# Patient Record
Sex: MALE | Race: WHITE | NOT HISPANIC OR LATINO | Employment: FULL TIME | URBAN - METROPOLITAN AREA
[De-identification: names, ages, dates, MRNs, and addresses within clinical notes are randomized per-mention and may not be internally consistent; named-entity substitution may affect disease eponyms.]

---

## 2020-02-10 ENCOUNTER — CONVERSION ENCOUNTER (OUTPATIENT)
Dept: OTHER | Facility: HOSPITAL | Age: 55
End: 2020-02-10

## 2020-02-10 ENCOUNTER — OFFICE VISIT CONVERTED (OUTPATIENT)
Dept: CARDIOLOGY | Facility: CLINIC | Age: 55
End: 2020-02-10
Attending: SPECIALIST

## 2020-02-20 ENCOUNTER — CONVERSION ENCOUNTER (OUTPATIENT)
Dept: CARDIOLOGY | Facility: CLINIC | Age: 55
End: 2020-02-20
Attending: SPECIALIST

## 2020-02-27 ENCOUNTER — HOSPITAL ENCOUNTER (OUTPATIENT)
Dept: NUCLEAR MEDICINE | Facility: HOSPITAL | Age: 55
Discharge: HOME | End: 2020-02-27
Attending: SPECIALIST

## 2020-03-05 ENCOUNTER — OFFICE VISIT CONVERTED (OUTPATIENT)
Dept: CARDIOLOGY | Facility: CLINIC | Age: 55
End: 2020-03-05
Attending: SPECIALIST

## 2020-11-02 ENCOUNTER — OFFICE VISIT CONVERTED (OUTPATIENT)
Dept: CARDIOLOGY | Facility: CLINIC | Age: 55
End: 2020-11-02
Attending: SPECIALIST

## 2021-05-03 ENCOUNTER — CONVERSION ENCOUNTER (OUTPATIENT)
Dept: OTHER | Facility: HOSPITAL | Age: 56
End: 2021-05-03

## 2021-05-13 NOTE — PROGRESS NOTES
"   Progress Note      Patient Name: Marcell Gunn   Patient ID: [id number]   Sex: Male   YOB: 1965    Primary Care Provider: Annie KEVIN   Referring Provider: Annie KEVIN    Visit Date: November 2, 2020    Provider: Calderon Estes MD   Location: [facility]   Location Address: [address] [place] [address]  [id number]   Location Phone: [phone]          Chief Complaint  · Coronary artery disease   · Hypertension      History Of Present Illness  Marcell Gunn is a 54 year old morbidly obese /White male with history of hypertension; Coronary artery disease. His blood pressure is better controlled now. No shortness of breath.   CURRENT MEDICATIONS: include Amlodipine 5 mg qd; Lisinopril 20 mg bid; Hydrochlorot 12.5 mg qd; Carvedilol 12.5 mg bid;  mg qd. The dosage and frequency of the medications were reviewed with the patient.   PAST MEDICAL HISTORY: Negative for diabetes. Positive for hypertension, Coronary artery disease.   PSYCHOSOCIAL HISTORY: He drinks alcohol moderately and previously quit smoking.       Review of Systems  · Cardiovascular  o Denies  o : chest pain; palpitations (fast, fluttering, or skipping beats), swelling (feet, ankles, hands), shortness of breath while walking or lying flat  · Respiratory  o Denies  o : chronic or frequent cough, asthma or wheezing      Vitals  Date Time BP Position Site L\R Cuff Size HR RR TEMP (F) WT  HT  BMI kg/m2 BSA m2 O2 Sat FR L/min FiO2        11/02/2020 12:57 /74 Sitting    81 - R   252lbs 4oz 5'  8\" 38.35 2.34             Physical Examination  · Constitutional  o Appearance  o : Awake, alert, cooperative, pleasant. Patient is morbidly obese.   · Respiratory  o Inspection of Chest  o : No chest wall deformities, moving equal.  o Auscultation of Lungs  o : Good air entry with vesicular breath sounds.  · Cardiovascular  o Heart  o :   § Auscultation of " Heart  § : S1 and S2 regular. No S3. No S4. No murmurs.  o Peripheral Vascular System  o :   § Extremities  § : Peripheral pulses were well felt. No edema. No cyanosis.  · Gastrointestinal  o Abdominal Examination  o : No masses or organomegaly noted.          Assessment     IMPRESSION/PLAN    1. Hypertension better controlled. Continue current dose of  Amlodipine, Lisinopril and Carvedilol. Monitor blood pressure regularly.  Be on a low salt diet.   2. Atypical chest pain. Stress test was negative. I discussed with the patient again the results of his stress test.   3. See me back in six months.       MD CRISTA White/wt          Plan  · Instructions  o This note was transcribed by Juana Roy. CRISTA/wt  o The above service was transcribed by Juana Roy on my behalf and I attest to the accuracy of the note. CRISTA            Electronically Signed by: Lolita Roy-, -Author on November 5, 2020 01:56:32 PM  Electronically Co-signed by: Calderon Estes MD -Reviewer on November 8, 2020 09:43:09 AM

## 2021-05-14 VITALS
WEIGHT: 252.25 LBS | HEART RATE: 81 BPM | DIASTOLIC BLOOD PRESSURE: 74 MMHG | BODY MASS INDEX: 38.23 KG/M2 | SYSTOLIC BLOOD PRESSURE: 129 MMHG | HEIGHT: 68 IN

## 2021-05-14 VITALS
WEIGHT: 251.12 LBS | SYSTOLIC BLOOD PRESSURE: 161 MMHG | HEART RATE: 95 BPM | BODY MASS INDEX: 39.42 KG/M2 | DIASTOLIC BLOOD PRESSURE: 86 MMHG | HEIGHT: 67 IN

## 2021-05-15 VITALS
WEIGHT: 245.37 LBS | BODY MASS INDEX: 37.19 KG/M2 | HEART RATE: 72 BPM | DIASTOLIC BLOOD PRESSURE: 81 MMHG | SYSTOLIC BLOOD PRESSURE: 161 MMHG | HEIGHT: 68 IN

## 2021-05-15 VITALS
SYSTOLIC BLOOD PRESSURE: 161 MMHG | WEIGHT: 244.25 LBS | DIASTOLIC BLOOD PRESSURE: 90 MMHG | BODY MASS INDEX: 37.02 KG/M2 | HEIGHT: 68 IN | HEART RATE: 77 BPM

## 2021-05-19 ENCOUNTER — TRANSCRIBE ORDERS (OUTPATIENT)
Dept: CARDIOLOGY | Facility: CLINIC | Age: 56
End: 2021-05-19

## 2021-05-19 DIAGNOSIS — R94.39 ABNORMAL STRESS TEST: Primary | ICD-10-CM

## 2021-06-16 RX ORDER — CARVEDILOL 12.5 MG/1
12.5 TABLET ORAL 2 TIMES DAILY WITH MEALS
COMMUNITY
End: 2021-06-16 | Stop reason: SDUPTHER

## 2021-06-16 RX ORDER — CARVEDILOL 12.5 MG/1
12.5 TABLET ORAL 2 TIMES DAILY
Qty: 180 TABLET | Refills: 3 | Status: SHIPPED | OUTPATIENT
Start: 2021-06-16 | End: 2021-08-15 | Stop reason: HOSPADM

## 2021-06-16 RX ORDER — LISINOPRIL 20 MG/1
20 TABLET ORAL 2 TIMES DAILY
Qty: 180 TABLET | Refills: 3 | Status: SHIPPED | OUTPATIENT
Start: 2021-06-16 | End: 2021-08-15 | Stop reason: HOSPADM

## 2021-06-16 RX ORDER — LISINOPRIL 20 MG/1
20 TABLET ORAL 2 TIMES DAILY
COMMUNITY
End: 2021-06-16 | Stop reason: SDUPTHER

## 2021-06-19 RX ORDER — MULTIPLE VITAMINS W/ MINERALS TAB 9MG-400MCG
1 TAB ORAL DAILY
COMMUNITY

## 2021-06-19 RX ORDER — HYDROCHLOROTHIAZIDE 12.5 MG/1
12.5 TABLET ORAL DAILY
COMMUNITY
End: 2021-08-15 | Stop reason: HOSPADM

## 2021-06-19 RX ORDER — ASPIRIN 325 MG
325 TABLET ORAL DAILY
COMMUNITY
End: 2021-08-15 | Stop reason: HOSPADM

## 2021-06-19 RX ORDER — AMLODIPINE BESYLATE 5 MG/1
5 TABLET ORAL DAILY
COMMUNITY
End: 2021-08-15 | Stop reason: HOSPADM

## 2021-06-20 NOTE — PROGRESS NOTES
06/24/21 0000   Pre-Procedure Phone Call   Procedure Time Verified Yes   Arrival Time 1245  (6/24/2021)   Procedure Location Verified Yes   Medical History Reviewed No   NPO Status Reinforced Yes   Ride and Caregiver Arranged N/A   Patient Knows to Bring Current Medications No   Bring Outside Films Requested No

## 2021-06-24 ENCOUNTER — HOSPITAL ENCOUNTER (OUTPATIENT)
Dept: CT IMAGING | Facility: HOSPITAL | Age: 56
Discharge: HOME | End: 2021-06-24
Admitting: SPECIALIST

## 2021-06-24 VITALS
TEMPERATURE: 97.8 F | DIASTOLIC BLOOD PRESSURE: 81 MMHG | HEART RATE: 71 BPM | OXYGEN SATURATION: 98 % | RESPIRATION RATE: 18 BRPM | SYSTOLIC BLOOD PRESSURE: 143 MMHG

## 2021-06-24 DIAGNOSIS — R94.39 ABNORMAL STRESS TEST: ICD-10-CM

## 2021-06-24 LAB
CREAT BLDA-MCNC: 0.8 MG/DL (ref 0.6–1.3)
QT INTERVAL: 393 MS

## 2021-06-24 PROCEDURE — 0 IOPAMIDOL PER 1 ML: Performed by: SPECIALIST

## 2021-06-24 PROCEDURE — 82565 ASSAY OF CREATININE: CPT

## 2021-06-24 PROCEDURE — 93010 ELECTROCARDIOGRAM REPORT: CPT | Performed by: INTERNAL MEDICINE

## 2021-06-24 PROCEDURE — 75574 CT ANGIO HRT W/3D IMAGE: CPT | Performed by: INTERNAL MEDICINE

## 2021-06-24 PROCEDURE — 93005 ELECTROCARDIOGRAM TRACING: CPT | Performed by: SPECIALIST

## 2021-06-24 PROCEDURE — 75574 CT ANGIO HRT W/3D IMAGE: CPT

## 2021-06-24 RX ADMIN — IOPAMIDOL 95 ML: 755 INJECTION, SOLUTION INTRAVENOUS at 15:08

## 2021-06-24 RX ADMIN — METOPROLOL TARTRATE 10 MG: 5 INJECTION, SOLUTION INTRAVENOUS at 14:24

## 2021-06-24 NOTE — NURSING NOTE
IV d/c'd intact. No problems or concerns noted / Patient and nurse with appropriate PPE in place. Patient discharged in wheelchair.

## 2021-06-24 NOTE — PROGRESS NOTES
06/24/21 1512   Patient Observation   Observations Patient returned Xray Triage post CTA. Denies pain at this time. Patient and nurse with approprite PPE in place.

## 2021-06-24 NOTE — NURSING NOTE
Attempted IV x 2 sticks Right AC, able to get blood return, unable to thread the catheter. Phoned IV therapy to come.

## 2021-06-24 NOTE — NURSING NOTE
Patient arrived to radiology triage bay 7.  Patient is mask compliant.  I am wearing a mask and eye protection to care for patient. Eda, wife, driving him home today.

## 2021-06-24 NOTE — NURSING NOTE
Julio Dawson, RN phoned back with orders from Dr. Gray.  Give 10 mg Metoprolol IV now and recheck the VS in 10 mins. If HR less than 65, proceed with CTA. If not less than 65, call her back. Repeated and verified.

## 2021-06-24 NOTE — NURSING NOTE
Julio Dawson, RN Cardiology phoned to notify of patient's HR 75, /84, She will talk to Dr. Gray and call me back with orders.

## 2021-06-25 ENCOUNTER — DOCUMENTATION (OUTPATIENT)
Dept: CARDIOLOGY | Facility: CLINIC | Age: 56
End: 2021-06-25

## 2021-06-25 NOTE — PROGRESS NOTES
Cardiac CTA with morphology  6/24/21  Reason for the exam: chest pain    Calcium score is 470 Agatston units.  This is between the  percentile for men between the ages of 50-54 years old.    Heart rate 60 bpm.  Left ventricular end-diastolic volume 189 mL.  Left ventricular end-systolic volume 90 mL.  Ejection fraction 52%.  Stroke volume 98 mL.  Cardiac output 5884 mL/m    The right atrium is normal in size.  The right ventricle is normal in size.  There is grossly normal right ventricular systolic function.  The pulmonary artery is normal in size.  There are 4 pulmonary veins which enter the left atrium in their expected location.  The left atrial appendage was visualized and is without thrombus.  The intra-atrial septum appeared to be intact.  The left ventricle is normal in size with normal systolic function.  There was no evidence of a left ventricular thrombus.  The intraventricular septum appeared to be intact.  The mitral valve appeared structurally normal.  The aortic valve was trileaflet and appears structurally and functionally normal.  The pulmonic valve appeared structurally normal.  The tricuspid valve appeared structurally normal.  There was no pericardial effusion.  The pericardium appeared normal.    The left main coronary artery came off the left coronary cusp in its anticipated location.  It bifurcated into the left anterior descending artery and the circumflex coronary artery.  There was no evidence of atherosclerotic disease of the left main coronary artery.      Left anterior descending artery to the apex of the heart.  There is calcified plaque in the proximal and mid left anterior descending artery.  The calcified plaque in the mid vessel appears that it may be flow-limiting.  There is a large first diagonal branch that has significant calcified plaque which appears to be flow-limiting.    The circumflex is codominant.  There is a calcified plaque in the mid vessel that does not appear  to be flow-limiting.  There are calcified plaques in the distal circumflex.  This part of the vessel was too small to determine if the plaque was flow-limiting.    The right coronary artery is codominant.  There is calcified and noncalcified plaque in the mid vessel.  This does not appear to be flow-limiting.  There is calcified plaque in the distal right coronary artery and in the posterior descending artery.  These vessels were too small to determine if the plaque was flow-limiting.    Conclusions:  1.  Calcium score is 470 Agatston units.  This is between the  percentile for men between the ages of 50-54 years old.  2.  Structurally normal heart.  3.  This patient has calcified coronary artery disease.  See details above.  Some of the plaque do appear to be flow-limiting.  I recommend a heart catheterization if clinically indicated.    Opal Gray MD  06/25/21

## 2021-06-28 ENCOUNTER — TELEPHONE (OUTPATIENT)
Dept: CARDIOLOGY | Facility: CLINIC | Age: 56
End: 2021-06-28

## 2021-06-28 NOTE — TELEPHONE ENCOUNTER
Attempted to contact patient to discuss results of CTA and to schedule f/u appt. Left VM requesting return call.

## 2021-06-28 NOTE — TELEPHONE ENCOUNTER
----- Message from Calderon Estes MD sent at 6/26/2021  6:59 AM EDT -----  Abnormal test.  Call patient. Will need cath. Schedule for f/u next week Thursday.

## 2021-06-29 NOTE — TELEPHONE ENCOUNTER
Spoke with patient.  Informed him of CTA results.  Per Dr. Estes schedule patient for this Thursday 7/1 in [facility].    Patient scheduled for 7/1 in [facility] at 11:15.

## 2021-07-01 ENCOUNTER — OFFICE VISIT (OUTPATIENT)
Dept: CARDIOLOGY | Facility: CLINIC | Age: 56
End: 2021-07-01

## 2021-07-01 ENCOUNTER — PREP FOR SURGERY (OUTPATIENT)
Dept: OTHER | Facility: HOSPITAL | Age: 56
End: 2021-07-01

## 2021-07-01 VITALS
HEART RATE: 70 BPM | SYSTOLIC BLOOD PRESSURE: 142 MMHG | HEIGHT: 67 IN | BODY MASS INDEX: 38.14 KG/M2 | DIASTOLIC BLOOD PRESSURE: 86 MMHG | WEIGHT: 243 LBS

## 2021-07-01 DIAGNOSIS — R07.9 CHEST PAIN, UNSPECIFIED TYPE: ICD-10-CM

## 2021-07-01 DIAGNOSIS — I25.10 CORONARY ARTERY DISEASE INVOLVING NATIVE CORONARY ARTERY OF NATIVE HEART WITHOUT ANGINA PECTORIS: Primary | ICD-10-CM

## 2021-07-01 DIAGNOSIS — I25.10 CORONARY ARTERY DISEASE: Primary | ICD-10-CM

## 2021-07-01 DIAGNOSIS — R07.9 CHEST PAIN: ICD-10-CM

## 2021-07-01 PROCEDURE — 99214 OFFICE O/P EST MOD 30 MIN: CPT | Performed by: SPECIALIST

## 2021-07-01 RX ORDER — DIAZEPAM 5 MG/1
5 TABLET ORAL ONCE
Status: CANCELLED | OUTPATIENT
Start: 2021-07-01 | End: 2021-07-01

## 2021-07-01 RX ORDER — DIPHENHYDRAMINE HCL 25 MG
25 CAPSULE ORAL
Status: CANCELLED | OUTPATIENT
Start: 2021-07-01 | End: 2021-07-02

## 2021-07-01 RX ORDER — ACETAMINOPHEN 325 MG/1
650 TABLET ORAL EVERY 4 HOURS PRN
Status: CANCELLED | OUTPATIENT
Start: 2021-07-01

## 2021-07-01 NOTE — PROGRESS NOTES
[facility]  Cardiology progress Note    Patient Name: Marcell Gunn  : 1965    CC: History of chest pain    Subjective   Subjective       HPI:  Marcell Gunn is a 55 y.o. male with history of chest pain several weeks ago.  He has no further chest pain or shortness of breath.  Recent CTA of the coronaries showed significant disease in the LAD.    Review of Systems:   Constitutional no fever,  no weight loss   Skin no rash   Otolaryngeal no difficulty swallowing   Cardiovascular See HPI   Pulmonary no cough, no sputum production   Gastrointestinal no constipation, no diarrhea   Genitourinary no dysuria, no hematuria   Hematologic no easy bruisability, no abnormal bleeding   Musculoskeletal no muscle pain   Neurologic no dizziness, no falls         Personal History     Social History:  reports that he has never smoked. He has quit using smokeless tobacco.  His smokeless tobacco use included snuff. He reports current alcohol use. He reports that he does not use drugs.    Home Medications:  Current Outpatient Medications on File Prior to Visit   Medication Sig   • amLODIPine (NORVASC) 5 MG tablet Take 5 mg by mouth Daily.   • aspirin 325 MG tablet Take 325 mg by mouth Daily.   • carvedilol (COREG) 12.5 MG tablet Take 1 tablet by mouth 2 (two) times a day.   • Cholecalciferol (Vitamin D3) 25 MCG (1000 UT) capsule Take 1,000 Units by mouth Daily.   • hydroCHLOROthiazide (HYDRODIURIL) 12.5 MG tablet Take 12.5 mg by mouth Daily.   • lisinopril (PRINIVIL,ZESTRIL) 20 MG tablet Take 1 tablet by mouth 2 (two) times a day.   • multivitamin with minerals (MULTIVITAMIN MEN 50+ PO) Take 1 tablet by mouth Daily.   • Zinc 50 MG capsule Take 1 tablet by mouth Daily.     No current facility-administered medications on file prior to visit.     Allergies:  No Known Allergies    Objective    Objective       Vitals:   Heart Rate:  [68-70] 70  BP: (142-154)/(86-94) 142/86  Body mass index is 38.06 kg/m².     Physical  Exam:   Constitutional: Awake, alert, No acute distress    Eyes: PERRLA, sclerae anicteric, no conjunctival injection   HENT: NCAT, mucous membranes moist   Neck: Supple, no thyromegaly, no lymphadenopathy, trachea midline   Respiratory: Clear to auscultation bilaterally, nonlabored respirations    Cardiovascular: RRR, no murmurs, rubs, or gallops, palpable pedal pulses bilaterally   Gastrointestinal: Positive bowel sounds, soft, nontender, nondistended   Musculoskeletal: No bilateral ankle edema, no clubbing or cyanosis to extremities   Psychiatric: Appropriate affect, cooperative   Neurologic: Oriented x 3, strength symmetric in all extremities, Cranial Nerves grossly intact to confrontation, speech clear   Skin: No rashes.    Result Review    Result Review:  I have personally reviewed the available results from  [x]  Laboratory  [x]  EKG  [x]  Cardiology  [x]  Medications  [x]  Old records  []  Other:   Procedures      Impression/Plan  1.  Coronary disease/significant disease on CTA: Discussed with the patient the results of a CT angiography of the coronaries.  Discussed with the patient all risk benefits of cardiac catheterization including the risk of myocardial infarction, peripheral vascular complications, CVA, cardiac arrest.  He understands and consents of the procedure.  2.  Essential hypertension controlled: Continue lisinopril 20 mg once a day.  Continue amlodipine 5 mg once a day.  3.  Hyperlipidemia: Check lipid profile.  Will need to be on statins.             Electronically signed by Calderon Estes MD, 07/01/21, 12:52 PM EDT.

## 2021-08-03 ENCOUNTER — TELEPHONE (OUTPATIENT)
Dept: CARDIOLOGY | Facility: CLINIC | Age: 56
End: 2021-08-03

## 2021-08-03 ENCOUNTER — PREP FOR SURGERY (OUTPATIENT)
Dept: OTHER | Facility: HOSPITAL | Age: 56
End: 2021-08-03

## 2021-08-03 DIAGNOSIS — I25.118 CORONARY ARTERY DISEASE WITH STABLE ANGINA PECTORIS (HCC): ICD-10-CM

## 2021-08-03 DIAGNOSIS — I25.118 CORONARY ARTERY DISEASE WITH STABLE ANGINA PECTORIS (HCC): Primary | ICD-10-CM

## 2021-08-03 RX ORDER — DIPHENHYDRAMINE HCL 25 MG
25 CAPSULE ORAL
Status: CANCELLED | OUTPATIENT
Start: 2021-08-03 | End: 2021-08-04

## 2021-08-03 RX ORDER — DIAZEPAM 5 MG/1
5 TABLET ORAL ONCE
Status: CANCELLED | OUTPATIENT
Start: 2021-08-03 | End: 2021-08-03

## 2021-08-03 RX ORDER — SODIUM CHLORIDE 0.9 % (FLUSH) 0.9 %
3 SYRINGE (ML) INJECTION EVERY 12 HOURS SCHEDULED
Status: CANCELLED | OUTPATIENT
Start: 2021-08-03

## 2021-08-03 RX ORDER — SODIUM CHLORIDE 0.9 % (FLUSH) 0.9 %
10 SYRINGE (ML) INJECTION AS NEEDED
Status: CANCELLED | OUTPATIENT
Start: 2021-08-03

## 2021-08-03 NOTE — TELEPHONE ENCOUNTER
Cath orders in.    Patient had covid test on Saturday at [facility] in [place], [place].  Results requested.  Per patient- results negative.

## 2021-08-03 NOTE — TELEPHONE ENCOUNTER
Dr. Estes-    Patient was tentatively scheduled for cardiac cath tomorrow, 8/4.  Apparently the correct orders were not placed so the procedure did not get scheduled.    Please place cath orders.  A Case Request for Cath lab must be placed.

## 2021-08-04 ENCOUNTER — PREP FOR SURGERY (OUTPATIENT)
Dept: OTHER | Facility: HOSPITAL | Age: 56
End: 2021-08-04

## 2021-08-04 ENCOUNTER — HOSPITAL ENCOUNTER (OUTPATIENT)
Facility: HOSPITAL | Age: 56
Setting detail: SURGERY ADMIT
End: 2021-08-04
Attending: THORACIC SURGERY (CARDIOTHORACIC VASCULAR SURGERY) | Admitting: THORACIC SURGERY (CARDIOTHORACIC VASCULAR SURGERY)

## 2021-08-04 ENCOUNTER — HOSPITAL ENCOUNTER (OUTPATIENT)
Facility: HOSPITAL | Age: 56
Setting detail: HOSPITAL OUTPATIENT SURGERY
Discharge: HOME | End: 2021-08-04
Attending: SPECIALIST | Admitting: SPECIALIST

## 2021-08-04 ENCOUNTER — TELEPHONE (OUTPATIENT)
Dept: CARDIAC SURGERY | Facility: CLINIC | Age: 56
End: 2021-08-04

## 2021-08-04 VITALS
OXYGEN SATURATION: 96 % | HEART RATE: 72 BPM | RESPIRATION RATE: 18 BRPM | DIASTOLIC BLOOD PRESSURE: 87 MMHG | HEIGHT: 67 IN | WEIGHT: 239.86 LBS | SYSTOLIC BLOOD PRESSURE: 144 MMHG | BODY MASS INDEX: 37.65 KG/M2 | TEMPERATURE: 98.9 F

## 2021-08-04 DIAGNOSIS — I25.118 CORONARY ARTERY DISEASE OF NATIVE HEART WITH STABLE ANGINA PECTORIS, UNSPECIFIED VESSEL OR LESION TYPE (HCC): Primary | ICD-10-CM

## 2021-08-04 DIAGNOSIS — R07.9 CHEST PAIN: ICD-10-CM

## 2021-08-04 DIAGNOSIS — I25.118 CORONARY ARTERY DISEASE WITH STABLE ANGINA PECTORIS (HCC): ICD-10-CM

## 2021-08-04 DIAGNOSIS — I25.10 CORONARY ARTERY DISEASE: ICD-10-CM

## 2021-08-04 LAB
ANION GAP SERPL CALCULATED.3IONS-SCNC: 9.6 MMOL/L (ref 5–15)
APTT PPP: 23.5 SECONDS (ref 22.2–34.2)
BASOPHILS # BLD AUTO: 0.03 10*3/MM3 (ref 0–0.2)
BASOPHILS NFR BLD AUTO: 0.5 % (ref 0–1.5)
BUN SERPL-MCNC: 13 MG/DL (ref 6–20)
BUN/CREAT SERPL: 14.9 (ref 7–25)
CALCIUM SPEC-SCNC: 9.1 MG/DL (ref 8.6–10.5)
CHLORIDE SERPL-SCNC: 100 MMOL/L (ref 98–107)
CO2 SERPL-SCNC: 26.4 MMOL/L (ref 22–29)
CREAT SERPL-MCNC: 0.87 MG/DL (ref 0.76–1.27)
DEPRECATED RDW RBC AUTO: 45.7 FL (ref 37–54)
EOSINOPHIL # BLD AUTO: 0.08 10*3/MM3 (ref 0–0.4)
EOSINOPHIL NFR BLD AUTO: 1.3 % (ref 0.3–6.2)
ERYTHROCYTE [DISTWIDTH] IN BLOOD BY AUTOMATED COUNT: 13 % (ref 12.3–15.4)
GFR SERPL CREATININE-BSD FRML MDRD: 91 ML/MIN/1.73
GLUCOSE SERPL-MCNC: 121 MG/DL (ref 65–99)
HCT VFR BLD AUTO: 40.8 % (ref 37.5–51)
HGB BLD-MCNC: 14.3 G/DL (ref 13–17.7)
IMM GRANULOCYTES # BLD AUTO: 0.03 10*3/MM3 (ref 0–0.05)
IMM GRANULOCYTES NFR BLD AUTO: 0.5 % (ref 0–0.5)
LYMPHOCYTES # BLD AUTO: 2.37 10*3/MM3 (ref 0.7–3.1)
LYMPHOCYTES NFR BLD AUTO: 38.9 % (ref 19.6–45.3)
MCH RBC QN AUTO: 34 PG (ref 26.6–33)
MCHC RBC AUTO-ENTMCNC: 35 G/DL (ref 31.5–35.7)
MCV RBC AUTO: 96.9 FL (ref 79–97)
MONOCYTES # BLD AUTO: 0.5 10*3/MM3 (ref 0.1–0.9)
MONOCYTES NFR BLD AUTO: 8.2 % (ref 5–12)
NEUTROPHILS NFR BLD AUTO: 3.09 10*3/MM3 (ref 1.7–7)
NEUTROPHILS NFR BLD AUTO: 50.6 % (ref 42.7–76)
NRBC BLD AUTO-RTO: 0 /100 WBC (ref 0–0.2)
PLATELET # BLD AUTO: 211 10*3/MM3 (ref 140–450)
PMV BLD AUTO: 9.4 FL (ref 6–12)
POTASSIUM SERPL-SCNC: 3.8 MMOL/L (ref 3.5–5.2)
RBC # BLD AUTO: 4.21 10*6/MM3 (ref 4.14–5.8)
SODIUM SERPL-SCNC: 136 MMOL/L (ref 136–145)
WBC # BLD AUTO: 6.1 10*3/MM3 (ref 3.4–10.8)

## 2021-08-04 PROCEDURE — 85025 COMPLETE CBC W/AUTO DIFF WBC: CPT | Performed by: SPECIALIST

## 2021-08-04 PROCEDURE — 25010000002 HEPARIN (PORCINE) PER 1000 UNITS: Performed by: SPECIALIST

## 2021-08-04 PROCEDURE — 25010000002 FENTANYL CITRATE (PF) 50 MCG/ML SOLUTION: Performed by: SPECIALIST

## 2021-08-04 PROCEDURE — 93458 L HRT ARTERY/VENTRICLE ANGIO: CPT | Performed by: SPECIALIST

## 2021-08-04 PROCEDURE — 93005 ELECTROCARDIOGRAM TRACING: CPT | Performed by: SPECIALIST

## 2021-08-04 PROCEDURE — 99152 MOD SED SAME PHYS/QHP 5/>YRS: CPT | Performed by: SPECIALIST

## 2021-08-04 PROCEDURE — 25010000002 MIDAZOLAM PER 1MG: Performed by: SPECIALIST

## 2021-08-04 PROCEDURE — 85730 THROMBOPLASTIN TIME PARTIAL: CPT | Performed by: SPECIALIST

## 2021-08-04 PROCEDURE — C1894 INTRO/SHEATH, NON-LASER: HCPCS | Performed by: SPECIALIST

## 2021-08-04 PROCEDURE — 0 IOPAMIDOL PER 1 ML: Performed by: SPECIALIST

## 2021-08-04 PROCEDURE — S0260 H&P FOR SURGERY: HCPCS | Performed by: SPECIALIST

## 2021-08-04 PROCEDURE — 80048 BASIC METABOLIC PNL TOTAL CA: CPT | Performed by: SPECIALIST

## 2021-08-04 RX ORDER — ACETAMINOPHEN 325 MG/1
650 TABLET ORAL EVERY 4 HOURS PRN
Status: DISCONTINUED | OUTPATIENT
Start: 2021-08-04 | End: 2021-08-04

## 2021-08-04 RX ORDER — FENTANYL CITRATE 50 UG/ML
INJECTION, SOLUTION INTRAMUSCULAR; INTRAVENOUS AS NEEDED
Status: DISCONTINUED | OUTPATIENT
Start: 2021-08-04 | End: 2021-08-04 | Stop reason: HOSPADM

## 2021-08-04 RX ORDER — ATORVASTATIN CALCIUM 20 MG/1
20 TABLET, FILM COATED ORAL NIGHTLY
Qty: 90 TABLET | Refills: 3 | Status: SHIPPED | OUTPATIENT
Start: 2021-08-04 | End: 2021-12-13 | Stop reason: SDUPTHER

## 2021-08-04 RX ORDER — DIAZEPAM 5 MG/1
5 TABLET ORAL ONCE
Status: DISCONTINUED | OUTPATIENT
Start: 2021-08-04 | End: 2021-08-04 | Stop reason: HOSPADM

## 2021-08-04 RX ORDER — MIDAZOLAM HYDROCHLORIDE 2 MG/2ML
INJECTION, SOLUTION INTRAMUSCULAR; INTRAVENOUS AS NEEDED
Status: DISCONTINUED | OUTPATIENT
Start: 2021-08-04 | End: 2021-08-04 | Stop reason: HOSPADM

## 2021-08-04 RX ORDER — SODIUM CHLORIDE 0.9 % (FLUSH) 0.9 %
3 SYRINGE (ML) INJECTION EVERY 12 HOURS SCHEDULED
Status: DISCONTINUED | OUTPATIENT
Start: 2021-08-04 | End: 2021-08-04 | Stop reason: HOSPADM

## 2021-08-04 RX ORDER — ACETAMINOPHEN 325 MG/1
650 TABLET ORAL EVERY 4 HOURS PRN
Status: DISCONTINUED | OUTPATIENT
Start: 2021-08-04 | End: 2021-08-04 | Stop reason: HOSPADM

## 2021-08-04 RX ORDER — HEPARIN SODIUM 1000 [USP'U]/ML
INJECTION, SOLUTION INTRAVENOUS; SUBCUTANEOUS AS NEEDED
Status: DISCONTINUED | OUTPATIENT
Start: 2021-08-04 | End: 2021-08-04 | Stop reason: HOSPADM

## 2021-08-04 RX ORDER — DIPHENHYDRAMINE HCL 25 MG
25 CAPSULE ORAL
Status: DISCONTINUED | OUTPATIENT
Start: 2021-08-04 | End: 2021-08-04 | Stop reason: HOSPADM

## 2021-08-04 RX ORDER — SODIUM CHLORIDE 0.9 % (FLUSH) 0.9 %
10 SYRINGE (ML) INJECTION AS NEEDED
Status: DISCONTINUED | OUTPATIENT
Start: 2021-08-04 | End: 2021-08-04 | Stop reason: HOSPADM

## 2021-08-04 RX ORDER — NITROGLYCERIN 5 MG/ML
INJECTION, SOLUTION INTRAVENOUS AS NEEDED
Status: DISCONTINUED | OUTPATIENT
Start: 2021-08-04 | End: 2021-08-04 | Stop reason: HOSPADM

## 2021-08-04 RX ORDER — LIDOCAINE HYDROCHLORIDE 20 MG/ML
INJECTION, SOLUTION INFILTRATION; PERINEURAL AS NEEDED
Status: DISCONTINUED | OUTPATIENT
Start: 2021-08-04 | End: 2021-08-04 | Stop reason: HOSPADM

## 2021-08-04 NOTE — PRE-SEDATION DOCUMENTATION
Sedation Plan    ASA 2     Mallampati class: I.    Risks, benefits, and alternatives discussed with patient.

## 2021-08-04 NOTE — DISCHARGE INSTRUCTIONS
Radial Site Care    This sheet gives you information about how to care for yourself after your procedure. Your health care provider may also give you more specific instructions. If you have problems or questions, contact your health care provider.  What can I expect after the procedure?  After the procedure, it is common to have:  · Bruising and tenderness at the catheter insertion area.  Follow these instructions at home:  Medicines  · Take over-the-counter and prescription medicines only as told by your health care provider.  Insertion site care  · Follow instructions from your health care provider about how to take care of your insertion site. Make sure you:  ? Wash your hands with soap and water before you change your bandage (dressing). If soap and water are not available, use hand .  ? Change your dressing as told by your health care provider.  ? Leave stitches (sutures), skin glue, or adhesive strips in place. These skin closures may need to stay in place for 2 weeks or longer. If adhesive strip edges start to loosen and curl up, you may trim the loose edges. Do not remove adhesive strips completely unless your health care provider tells you to do that.  · Check your insertion site every day for signs of infection. Check for:  ? Redness, swelling, or pain.  ? Fluid or blood.  ? Pus or a bad smell.  ? Warmth.  · Do not take baths, swim, or use a hot tub until your health care provider approves.  · You may shower 24-48 hours after the procedure, or as directed by your health care provider.  ? Remove the dressing and gently wash the site with plain soap and water.  ? Pat the area dry with a clean towel.  ? Do not rub the site. That could cause bleeding.  · Do not apply powder or lotion to the site.  Activity    · For 24 hours after the procedure, or as directed by your health care provider:  ? Do not flex or bend the affected arm.  ? Do not push or pull heavy objects with the affected arm.  ? Do not  drive yourself home from the hospital or clinic. You may drive 24 hours after the procedure unless your health care provider tells you not to.  ? Do not operate machinery or power tools.  · Do not lift anything that is heavier than 10 lb (4.5 kg), or the limit that you are told, until your health care provider says that it is safe.  · Ask your health care provider when it is okay to:  ? Return to work or school.  ? Resume usual physical activities or sports.  ? Resume sexual activity.  General instructions  · If the catheter site starts to bleed, raise your arm and put firm pressure on the site. If the bleeding does not stop, get help right away. This is a medical emergency.  · If you went home on the same day as your procedure, a responsible adult should be with you for the first 24 hours after you arrive home.  · Keep all follow-up visits as told by your health care provider. This is important.  Contact a health care provider if:  · You have a fever.  · You have redness, swelling, or yellow drainage around your insertion site.  Get help right away if:  · You have unusual pain at the radial site.  · The catheter insertion area swells very fast.  · The insertion area is bleeding, and the bleeding does not stop when you hold steady pressure on the area.  · Your arm or hand becomes pale, cool, tingly, or numb.  These symptoms may represent a serious problem that is an emergency. Do not wait to see if the symptoms will go away. Get medical help right away. Call your local emergency services (911 in the [place] Do not drive yourself to the hospital.  Summary  · After the procedure, it is common to have bruising and tenderness at the site.  · Follow instructions from your health care provider about how to take care of your radial site wound. Check the wound every day for signs of infection.  · Do not lift anything that is heavier than 10 lb (4.5 kg), or the limit that you are told, until your health care provider says  that it is safe.  This information is not intended to replace advice given to you by your health care provider. Make sure you discuss any questions you have with your health care provider.  Document Revised: 01/23/2019 Document Reviewed: 01/23/2019  Elsevier Patient Education © 2021 [employer]

## 2021-08-04 NOTE — H&P
[facility]   Cardiology Consult Note    Patient Name: Marcell Gunn  : 1965  MRN: [id number]  Primary Care Physician:  Provider, No Known  Referring Physician: Calderon Estes MD  Date of admission: 2021    Subjective   Subjective     Reason for Consult/ Chief Complaint: Chest pain    HPI:  Marcell Gunn is a 55 y.o. male with history of chest pain few weeks ago.  Recent CT of the coronaries showed significant disease in the LAD.  No chest pain or shortness of breath recently.    Review of Systems:   Constitutional no fever,  no weight loss   Skin no rash   Otolaryngeal no difficulty swallowing   Cardiovascular See HPI   Pulmonary no cough, no sputum production   Gastrointestinal no constipation, no diarrhea   Genitourinary no dysuria, no hematuria   Hematologic no easy bruisability, no abnormal bleeding   Musculoskeletal no muscle pain   Neurologic no dizziness, no falls         Personal History       Past Medical/Surgical History:   Past Medical History:   Diagnosis Date   • Coronary artery disease    • Hyperlipidemia    • Hypertension          Family History: NoFamily History of CAD.    Social History:  reports that he has never smoked. He has quit using smokeless tobacco.  His smokeless tobacco use included snuff. He reports current alcohol use. He reports that he does not use drugs.    Medications:  Medications Prior to Admission   Medication Sig Dispense Refill Last Dose   • amLODIPine (NORVASC) 5 MG tablet Take 5 mg by mouth Daily.      • aspirin 325 MG tablet Take 325 mg by mouth Daily.      • carvedilol (COREG) 12.5 MG tablet Take 1 tablet by mouth 2 (two) times a day. 180 tablet 3    • Cholecalciferol (Vitamin D3) 25 MCG (1000 UT) capsule Take 1,000 Units by mouth Daily.      • hydroCHLOROthiazide (HYDRODIURIL) 12.5 MG tablet Take 12.5 mg by mouth Daily.      • lisinopril (PRINIVIL,ZESTRIL) 20 MG tablet Take 1 tablet by mouth 2 (two) times a day. 180 tablet 3    • multivitamin  with minerals (MULTIVITAMIN MEN 50+ PO) Take 1 tablet by mouth Daily.      • Zinc 50 MG capsule Take 1 tablet by mouth Daily.        Current medications:    Current IV drips:  No current facility-administered medications for this encounter.      Allergies:  No Known Allergies    Objective    Objective     Vitals:   Temp:  [98.5 °F (36.9 °C)] 98.5 °F (36.9 °C)  Heart Rate:  [70] 70  Resp:  [16] 16  BP: (150)/(83) 150/83      Physical Exam:   Constitutional: Awake, alert, No acute distress    Eyes: PERRLA, sclerae anicteric, no conjunctival injection   HENT: NCAT, mucous membranes moist   Neck: Supple, no thyromegaly, no lymphadenopathy, trachea midline   Respiratory: Clear to auscultation bilaterally, nonlabored respirations    Cardiovascular: RRR, no murmurs, rubs, or gallops, palpable pedal pulses bilaterally   Gastrointestinal: Positive bowel sounds, soft, nontender, nondistended   Musculoskeletal: No bilateral ankle edema, no clubbing or cyanosis to extremities   Psychiatric: Appropriate affect, cooperative   Neurologic: Oriented x 3, strength symmetric in all extremities, Cranial Nerves grossly intact to confrontation, speech clear   Skin: No rashes.    Result Review    Result Review:  I have personally reviewed the results from the time of this admission to 8/4/2021 06:49 EDT and agree with these findings:  [x]  Laboratory  [x]  EKG/Telemetry   [x]  Cardiology/Vascular   []  Pathology  [x]  Old records  [x]  Medications                                      EKG shows sinus rhythm with no acute changes.      Assessment / Plan     Impression/plan:  1.  Coronary disease/significant disease on CTA: Have discussed with the patient results of CT angiography of the coronaries.  All risk benefits of cardiac catheterization including the risk of myocardial infarction, peripheral vascular complications, CVA, cardiac arrest and death have been discussed the patient.  He understands and consents for the procedure.  2.   Essential hypertension controlled: Continue lisinopril and amlodipine.            Electronically signed by Calderon Estes MD, 08/04/21, 6:49 AM EDT.

## 2021-08-04 NOTE — TELEPHONE ENCOUNTER
Patient's wife called back and confirmed plans for admission on Monday, surgery Tuesday with Dr. Patten. Address give and verbal understanding.

## 2021-08-04 NOTE — TELEPHONE ENCOUNTER
Spoke to Mrs. Gunn with plans for him to come to office on Monday, 8-9-2021 at 11 am for office visit and admission. Explained to her that Earline will be out of the office that day but Tamara our APRN will be over to see patient and we will then get him admitted to hospital. Assured her that they will get to meet Dr. Patten prior to surgery. She is going to call me when they get  Home from hospital and I will give her our address and any other information that she might need.Patient has [facility], we will start authorization.

## 2021-08-06 LAB — QT INTERVAL: 421 MS

## 2021-08-09 ENCOUNTER — HOSPITAL ENCOUNTER (INPATIENT)
Facility: HOSPITAL | Age: 56
LOS: 6 days | Discharge: HOME HEALTH | End: 2021-08-15
Attending: THORACIC SURGERY (CARDIOTHORACIC VASCULAR SURGERY) | Admitting: THORACIC SURGERY (CARDIOTHORACIC VASCULAR SURGERY)

## 2021-08-09 ENCOUNTER — APPOINTMENT (OUTPATIENT)
Dept: CARDIOLOGY | Facility: HOSPITAL | Age: 56
End: 2021-08-09

## 2021-08-09 ENCOUNTER — ANESTHESIA EVENT (OUTPATIENT)
Dept: PERIOP | Facility: HOSPITAL | Age: 56
End: 2021-08-09

## 2021-08-09 ENCOUNTER — APPOINTMENT (OUTPATIENT)
Dept: GENERAL RADIOLOGY | Facility: HOSPITAL | Age: 56
End: 2021-08-09

## 2021-08-09 ENCOUNTER — OFFICE VISIT (OUTPATIENT)
Dept: CARDIAC SURGERY | Facility: CLINIC | Age: 56
End: 2021-08-09

## 2021-08-09 VITALS
BODY MASS INDEX: 36.88 KG/M2 | SYSTOLIC BLOOD PRESSURE: 147 MMHG | OXYGEN SATURATION: 97 % | RESPIRATION RATE: 20 BRPM | HEIGHT: 67 IN | DIASTOLIC BLOOD PRESSURE: 90 MMHG | HEART RATE: 85 BPM | WEIGHT: 235 LBS | TEMPERATURE: 97.8 F

## 2021-08-09 DIAGNOSIS — R09.02 POSTOPERATIVE HYPOXIA: ICD-10-CM

## 2021-08-09 DIAGNOSIS — I25.118 CORONARY ARTERY DISEASE OF NATIVE ARTERY OF NATIVE HEART WITH STABLE ANGINA PECTORIS (HCC): Primary | ICD-10-CM

## 2021-08-09 DIAGNOSIS — Z98.890 POSTOPERATIVE HYPOXIA: ICD-10-CM

## 2021-08-09 DIAGNOSIS — D64.9 ANEMIA, UNSPECIFIED TYPE: ICD-10-CM

## 2021-08-09 DIAGNOSIS — Z95.1 S/P CABG (CORONARY ARTERY BYPASS GRAFT): Primary | ICD-10-CM

## 2021-08-09 PROBLEM — I25.10 CAD (CORONARY ARTERY DISEASE): Status: ACTIVE | Noted: 2021-08-09

## 2021-08-09 LAB
ABO GROUP BLD: NORMAL
ALBUMIN SERPL-MCNC: 4.5 G/DL (ref 3.5–5.2)
ALBUMIN/GLOB SERPL: 1.7 G/DL
ALP SERPL-CCNC: 67 U/L (ref 39–117)
ALT SERPL W P-5'-P-CCNC: 56 U/L (ref 1–41)
ANION GAP SERPL CALCULATED.3IONS-SCNC: 12.6 MMOL/L (ref 5–15)
APTT PPP: 31.1 SECONDS (ref 22.7–35.4)
ARTERIAL PATENCY WRIST A: POSITIVE
AST SERPL-CCNC: 33 U/L (ref 1–40)
ATMOSPHERIC PRESS: 754.8 MMHG
BASE EXCESS BLDA CALC-SCNC: 2 MMOL/L (ref 0–2)
BASOPHILS # BLD AUTO: 0.03 10*3/MM3 (ref 0–0.2)
BASOPHILS NFR BLD AUTO: 0.4 % (ref 0–1.5)
BDY SITE: ABNORMAL
BH CV XLRA MEAS - DIST GSV CALF DIST LEFT: 0.21 CM
BH CV XLRA MEAS - DIST GSV CALF DIST RIGHT: 0.26 CM
BH CV XLRA MEAS - DIST GSV THIGH DIST LEFT: 0.26 CM
BH CV XLRA MEAS - DIST GSV THIGH DIST RIGHT: 0.24 CM
BH CV XLRA MEAS - DIST LSV CALF DIST LEFT: 0.1 CM
BH CV XLRA MEAS - DIST LSV CALF DIST RIGHT: 0.16 CM
BH CV XLRA MEAS - GSV ANKLE DIST LEFT: 0.2 CM
BH CV XLRA MEAS - GSV ANKLE DIST RIGHT: 0.25 CM
BH CV XLRA MEAS - GSV KNEE DIST LEFT: 0.26 CM
BH CV XLRA MEAS - GSV KNEE DIST RIGHT: 0.21 CM
BH CV XLRA MEAS - GSV ORIGIN DIST LEFT: 0.64 CM
BH CV XLRA MEAS - GSV ORIGIN DIST RIGHT: 0.44 CM
BH CV XLRA MEAS - MID GSV CALF LEFT: 0.2 CM
BH CV XLRA MEAS - MID GSV CALF RIGHT: 0.12 CM
BH CV XLRA MEAS - MID GSV THIGH  LEFT: 0.28 CM
BH CV XLRA MEAS - MID GSV THIGH  RIGHT: 0.25 CM
BH CV XLRA MEAS - MID LSV CALF DIST LEFT: 0.13 CM
BH CV XLRA MEAS - MID LSV CALF DIST RIGHT: 0.2 CM
BH CV XLRA MEAS - PROX GSV CALF DIST LEFT: 0.17 CM
BH CV XLRA MEAS - PROX GSV CALF DIST RIGHT: 0.17 CM
BH CV XLRA MEAS - PROX GSV THIGH  LEFT: 0.22 CM
BH CV XLRA MEAS - PROX GSV THIGH  RIGHT: 0.21 CM
BH CV XLRA MEAS - PROX LSV CALF DIST LEFT: 0.3 CM
BH CV XLRA MEAS - PROX LSV CALF DIST RIGHT: 0.27 CM
BH CV XLRA MEAS LEFT DIST CCA EDV: -18 CM/SEC
BH CV XLRA MEAS LEFT DIST CCA PSV: -90.7 CM/SEC
BH CV XLRA MEAS LEFT DIST ICA EDV: -30.2 CM/SEC
BH CV XLRA MEAS LEFT DIST ICA PSV: -100.4 CM/SEC
BH CV XLRA MEAS LEFT ICA/CCA RATIO: 1.17
BH CV XLRA MEAS LEFT MID ICA EDV: -25 CM/SEC
BH CV XLRA MEAS LEFT MID ICA PSV: -72.6 CM/SEC
BH CV XLRA MEAS LEFT PROX CCA EDV: 21.7 CM/SEC
BH CV XLRA MEAS LEFT PROX CCA PSV: 127.4 CM/SEC
BH CV XLRA MEAS LEFT PROX ECA EDV: -8.2 CM/SEC
BH CV XLRA MEAS LEFT PROX ECA PSV: -77.8 CM/SEC
BH CV XLRA MEAS LEFT PROX ICA EDV: -20.4 CM/SEC
BH CV XLRA MEAS LEFT PROX ICA PSV: -106.6 CM/SEC
BH CV XLRA MEAS LEFT PROX SCLA PSV: 159.1 CM/SEC
BH CV XLRA MEAS LEFT VERTEBRAL A EDV: 20.6 CM/SEC
BH CV XLRA MEAS LEFT VERTEBRAL A PSV: 64.9 CM/SEC
BH CV XLRA MEAS RIGHT DIST CCA EDV: -23.6 CM/SEC
BH CV XLRA MEAS RIGHT DIST CCA PSV: -95.7 CM/SEC
BH CV XLRA MEAS RIGHT DIST ICA EDV: -23.7 CM/SEC
BH CV XLRA MEAS RIGHT DIST ICA PSV: -72 CM/SEC
BH CV XLRA MEAS RIGHT ICA/CCA RATIO: 1.01
BH CV XLRA MEAS RIGHT MID ICA EDV: -27.3 CM/SEC
BH CV XLRA MEAS RIGHT MID ICA PSV: -96.9 CM/SEC
BH CV XLRA MEAS RIGHT PROX CCA EDV: 24.2 CM/SEC
BH CV XLRA MEAS RIGHT PROX CCA PSV: 105 CM/SEC
BH CV XLRA MEAS RIGHT PROX ECA EDV: -17.4 CM/SEC
BH CV XLRA MEAS RIGHT PROX ECA PSV: -91.9 CM/SEC
BH CV XLRA MEAS RIGHT PROX ICA EDV: -21.7 CM/SEC
BH CV XLRA MEAS RIGHT PROX ICA PSV: -90.1 CM/SEC
BH CV XLRA MEAS RIGHT PROX SCLA PSV: 121 CM/SEC
BH CV XLRA MEAS RIGHT VERTEBRAL A EDV: 12.5 CM/SEC
BH CV XLRA MEAS RIGHT VERTEBRAL A PSV: 46.3 CM/SEC
BILIRUB SERPL-MCNC: 0.5 MG/DL (ref 0–1.2)
BILIRUB UR QL STRIP: NEGATIVE
BLD GP AB SCN SERPL QL: NEGATIVE
BUN SERPL-MCNC: 12 MG/DL (ref 6–20)
BUN/CREAT SERPL: 14.8 (ref 7–25)
CALCIUM SPEC-SCNC: 9 MG/DL (ref 8.6–10.5)
CHLORIDE SERPL-SCNC: 100 MMOL/L (ref 98–107)
CHOLEST SERPL-MCNC: 166 MG/DL (ref 0–200)
CLARITY UR: CLEAR
CLOSE TME COLL+ADP + EPINEP PNL BLD: 96 % (ref 86–100)
CO2 SERPL-SCNC: 25.4 MMOL/L (ref 22–29)
COLOR UR: YELLOW
CREAT SERPL-MCNC: 0.81 MG/DL (ref 0.76–1.27)
DEPRECATED RDW RBC AUTO: 43.9 FL (ref 37–54)
EOSINOPHIL # BLD AUTO: 0.06 10*3/MM3 (ref 0–0.4)
EOSINOPHIL NFR BLD AUTO: 0.8 % (ref 0.3–6.2)
ERYTHROCYTE [DISTWIDTH] IN BLOOD BY AUTOMATED COUNT: 12.4 % (ref 12.3–15.4)
GFR SERPL CREATININE-BSD FRML MDRD: 99 ML/MIN/1.73
GLOBULIN UR ELPH-MCNC: 2.7 GM/DL
GLUCOSE BLDC GLUCOMTR-MCNC: 116 MG/DL (ref 70–130)
GLUCOSE BLDC GLUCOMTR-MCNC: 129 MG/DL (ref 70–130)
GLUCOSE SERPL-MCNC: 122 MG/DL (ref 65–99)
GLUCOSE UR STRIP-MCNC: NEGATIVE MG/DL
HBA1C MFR BLD: 5.5 % (ref 4.8–5.6)
HCO3 BLDA-SCNC: 27.1 MMOL/L (ref 22–28)
HCT VFR BLD AUTO: 39.2 % (ref 37.5–51)
HDLC SERPL-MCNC: 45 MG/DL (ref 40–60)
HGB BLD-MCNC: 13.7 G/DL (ref 13–17.7)
HGB UR QL STRIP.AUTO: NEGATIVE
IMM GRANULOCYTES # BLD AUTO: 0.04 10*3/MM3 (ref 0–0.05)
IMM GRANULOCYTES NFR BLD AUTO: 0.5 % (ref 0–0.5)
INR PPP: 1.05 (ref 0.9–1.1)
KETONES UR QL STRIP: NEGATIVE
LDLC SERPL CALC-MCNC: 86 MG/DL (ref 0–100)
LDLC/HDLC SERPL: 1.76 {RATIO}
LEFT ARM BP: NORMAL MMHG
LEUKOCYTE ESTERASE UR QL STRIP.AUTO: NEGATIVE
LYMPHOCYTES # BLD AUTO: 1.85 10*3/MM3 (ref 0.7–3.1)
LYMPHOCYTES NFR BLD AUTO: 24.8 % (ref 19.6–45.3)
MAGNESIUM SERPL-MCNC: 2 MG/DL (ref 1.6–2.6)
MCH RBC QN AUTO: 33.8 PG (ref 26.6–33)
MCHC RBC AUTO-ENTMCNC: 34.9 G/DL (ref 31.5–35.7)
MCV RBC AUTO: 96.8 FL (ref 79–97)
MODALITY: ABNORMAL
MONOCYTES # BLD AUTO: 0.68 10*3/MM3 (ref 0.1–0.9)
MONOCYTES NFR BLD AUTO: 9.1 % (ref 5–12)
NEUTROPHILS NFR BLD AUTO: 4.8 10*3/MM3 (ref 1.7–7)
NEUTROPHILS NFR BLD AUTO: 64.4 % (ref 42.7–76)
NITRITE UR QL STRIP: NEGATIVE
NRBC BLD AUTO-RTO: 0 /100 WBC (ref 0–0.2)
NT-PROBNP SERPL-MCNC: 108.9 PG/ML (ref 0–900)
PCO2 BLDA: 43.2 MM HG (ref 35–45)
PH BLDA: 7.41 PH UNITS (ref 7.35–7.45)
PH UR STRIP.AUTO: 5.5 [PH] (ref 5–8)
PLATELET # BLD AUTO: 201 10*3/MM3 (ref 140–450)
PMV BLD AUTO: 9.4 FL (ref 6–12)
PO2 BLDA: 73.8 MM HG (ref 80–100)
POTASSIUM SERPL-SCNC: 4.3 MMOL/L (ref 3.5–5.2)
PROT SERPL-MCNC: 7.2 G/DL (ref 6–8.5)
PROT UR QL STRIP: NEGATIVE
PROTHROMBIN TIME: 13.5 SECONDS (ref 11.7–14.2)
RBC # BLD AUTO: 4.05 10*6/MM3 (ref 4.14–5.8)
RH BLD: POSITIVE
RIGHT ARM BP: NORMAL MMHG
SAO2 % BLDCOA: 94.6 % (ref 92–99)
SARS-COV-2 ORF1AB RESP QL NAA+PROBE: NOT DETECTED
SODIUM SERPL-SCNC: 138 MMOL/L (ref 136–145)
SP GR UR STRIP: 1.01 (ref 1–1.03)
T&S EXPIRATION DATE: NORMAL
TOTAL RATE: 20 BREATHS/MINUTE
TRIGL SERPL-MCNC: 210 MG/DL (ref 0–150)
UROBILINOGEN UR QL STRIP: NORMAL
VLDLC SERPL-MCNC: 35 MG/DL (ref 5–40)
WBC # BLD AUTO: 7.46 10*3/MM3 (ref 3.4–10.8)

## 2021-08-09 PROCEDURE — 93970 EXTREMITY STUDY: CPT

## 2021-08-09 PROCEDURE — 99203 OFFICE O/P NEW LOW 30 MIN: CPT | Performed by: THORACIC SURGERY (CARDIOTHORACIC VASCULAR SURGERY)

## 2021-08-09 PROCEDURE — 85730 THROMBOPLASTIN TIME PARTIAL: CPT | Performed by: NURSE PRACTITIONER

## 2021-08-09 PROCEDURE — 81003 URINALYSIS AUTO W/O SCOPE: CPT | Performed by: NURSE PRACTITIONER

## 2021-08-09 PROCEDURE — U0004 COV-19 TEST NON-CDC HGH THRU: HCPCS | Performed by: THORACIC SURGERY (CARDIOTHORACIC VASCULAR SURGERY)

## 2021-08-09 PROCEDURE — 85576 BLOOD PLATELET AGGREGATION: CPT | Performed by: NURSE PRACTITIONER

## 2021-08-09 PROCEDURE — 86850 RBC ANTIBODY SCREEN: CPT | Performed by: NURSE PRACTITIONER

## 2021-08-09 PROCEDURE — 80053 COMPREHEN METABOLIC PANEL: CPT | Performed by: NURSE PRACTITIONER

## 2021-08-09 PROCEDURE — 36600 WITHDRAWAL OF ARTERIAL BLOOD: CPT

## 2021-08-09 PROCEDURE — 83880 ASSAY OF NATRIURETIC PEPTIDE: CPT | Performed by: NURSE PRACTITIONER

## 2021-08-09 PROCEDURE — 83036 HEMOGLOBIN GLYCOSYLATED A1C: CPT | Performed by: NURSE PRACTITIONER

## 2021-08-09 PROCEDURE — 93880 EXTRACRANIAL BILAT STUDY: CPT

## 2021-08-09 PROCEDURE — 93005 ELECTROCARDIOGRAM TRACING: CPT | Performed by: THORACIC SURGERY (CARDIOTHORACIC VASCULAR SURGERY)

## 2021-08-09 PROCEDURE — 83735 ASSAY OF MAGNESIUM: CPT | Performed by: NURSE PRACTITIONER

## 2021-08-09 PROCEDURE — 85025 COMPLETE CBC W/AUTO DIFF WBC: CPT | Performed by: NURSE PRACTITIONER

## 2021-08-09 PROCEDURE — 86920 COMPATIBILITY TEST SPIN: CPT

## 2021-08-09 PROCEDURE — 93010 ELECTROCARDIOGRAM REPORT: CPT | Performed by: INTERNAL MEDICINE

## 2021-08-09 PROCEDURE — 82962 GLUCOSE BLOOD TEST: CPT

## 2021-08-09 PROCEDURE — 86901 BLOOD TYPING SEROLOGIC RH(D): CPT | Performed by: NURSE PRACTITIONER

## 2021-08-09 PROCEDURE — 82803 BLOOD GASES ANY COMBINATION: CPT

## 2021-08-09 PROCEDURE — 80061 LIPID PANEL: CPT | Performed by: NURSE PRACTITIONER

## 2021-08-09 PROCEDURE — 85610 PROTHROMBIN TIME: CPT | Performed by: NURSE PRACTITIONER

## 2021-08-09 PROCEDURE — 71046 X-RAY EXAM CHEST 2 VIEWS: CPT

## 2021-08-09 PROCEDURE — 86900 BLOOD TYPING SEROLOGIC ABO: CPT | Performed by: NURSE PRACTITIONER

## 2021-08-09 RX ORDER — ALPRAZOLAM 0.25 MG/1
0.25 TABLET ORAL EVERY 8 HOURS PRN
Status: DISCONTINUED | OUTPATIENT
Start: 2021-08-09 | End: 2021-08-10

## 2021-08-09 RX ORDER — FAMOTIDINE 10 MG/ML
20 INJECTION, SOLUTION INTRAVENOUS ONCE
Status: CANCELLED | OUTPATIENT
Start: 2021-08-10 | End: 2021-08-09

## 2021-08-09 RX ORDER — ASPIRIN 81 MG/1
81 TABLET, CHEWABLE ORAL DAILY
Status: DISCONTINUED | OUTPATIENT
Start: 2021-08-09 | End: 2021-08-10

## 2021-08-09 RX ORDER — ACETAMINOPHEN 500 MG
1000 TABLET ORAL ONCE
Status: CANCELLED | OUTPATIENT
Start: 2021-08-10 | End: 2021-08-09

## 2021-08-09 RX ORDER — TEMAZEPAM 15 MG/1
15 CAPSULE ORAL NIGHTLY PRN
Status: DISCONTINUED | OUTPATIENT
Start: 2021-08-09 | End: 2021-08-10

## 2021-08-09 RX ORDER — CEFAZOLIN SODIUM 2 G/100ML
2 INJECTION, SOLUTION INTRAVENOUS
Status: COMPLETED | OUTPATIENT
Start: 2021-08-10 | End: 2021-08-10

## 2021-08-09 RX ORDER — CARVEDILOL 12.5 MG/1
12.5 TABLET ORAL EVERY 12 HOURS
Status: DISCONTINUED | OUTPATIENT
Start: 2021-08-09 | End: 2021-08-10

## 2021-08-09 RX ORDER — ACETAMINOPHEN 325 MG/1
650 TABLET ORAL EVERY 4 HOURS PRN
Status: DISCONTINUED | OUTPATIENT
Start: 2021-08-09 | End: 2021-08-10

## 2021-08-09 RX ORDER — LORAZEPAM 2 MG/ML
1 INJECTION INTRAMUSCULAR ONCE
Status: CANCELLED | OUTPATIENT
Start: 2021-08-10 | End: 2021-08-09

## 2021-08-09 RX ORDER — METHADONE HYDROCHLORIDE 10 MG/1
20 TABLET ORAL ONCE
Status: CANCELLED | OUTPATIENT
Start: 2021-08-10

## 2021-08-09 RX ORDER — CHLORHEXIDINE GLUCONATE 500 MG/1
1 CLOTH TOPICAL EVERY 12 HOURS
Status: DISCONTINUED | OUTPATIENT
Start: 2021-08-09 | End: 2021-08-10

## 2021-08-09 RX ORDER — ATORVASTATIN CALCIUM 20 MG/1
20 TABLET, FILM COATED ORAL NIGHTLY
Status: DISCONTINUED | OUTPATIENT
Start: 2021-08-09 | End: 2021-08-10

## 2021-08-09 RX ORDER — CHLORHEXIDINE GLUCONATE 0.12 MG/ML
15 RINSE ORAL EVERY 12 HOURS SCHEDULED
Status: DISCONTINUED | OUTPATIENT
Start: 2021-08-09 | End: 2021-08-10

## 2021-08-09 RX ADMIN — CHLORHEXIDINE GLUCONATE 1 APPLICATION: 500 CLOTH TOPICAL at 21:07

## 2021-08-09 RX ADMIN — ASPIRIN 81 MG: 81 TABLET, CHEWABLE ORAL at 15:54

## 2021-08-09 RX ADMIN — MUPIROCIN 1 APPLICATION: 20 OINTMENT TOPICAL at 21:03

## 2021-08-09 RX ADMIN — CARVEDILOL 12.5 MG: 12.5 TABLET, FILM COATED ORAL at 15:55

## 2021-08-09 RX ADMIN — CHLORHEXIDINE GLUCONATE 15 ML: 1.2 RINSE ORAL at 21:03

## 2021-08-09 RX ADMIN — ATORVASTATIN CALCIUM 20 MG: 20 TABLET, FILM COATED ORAL at 21:03

## 2021-08-09 NOTE — PROGRESS NOTES
8/9/2021      Subjective:          Chief Complaint: Fatigue and chest pain    History of Present Illness:       Dear Madhu and Colleagues,  It was nice to see Marcell Gunn in consultation at your request. He is a 55 y.o. male with a history of hypertension who has developed stable angina. He denies failure. The E. The Cardiac Cath that I reviewed personally shows 99% LAD.  90% small diagonal.  99% ramus intermedius.  100% distal circumflex.  60% OM1.  100% RCA.  Ejection fraction is 60%.  He underwent a CTA of the coronaries showing calcium and was referred for catheterization.  With multivessel coronary disease operation is advisable..    Patient Active Problem List   Diagnosis   • Coronary artery disease   • Chest pain       Past Medical History:   Diagnosis Date   • Coronary artery disease    • Hyperlipidemia    • Hypertension        Past Surgical History:   Procedure Laterality Date   • CARDIAC CATHETERIZATION Left 8/4/2021    Procedure: Left Cardiac Catheterization;  Surgeon: Calderon Estes MD;  Location: [facility] CATH INVASIVE LOCATION;  Service: Cardiovascular;  Laterality: Left;       No Known Allergies      Current Outpatient Medications:   •  amLODIPine (NORVASC) 5 MG tablet, Take 5 mg by mouth Daily., Disp: , Rfl:   •  aspirin 325 MG tablet, Take 325 mg by mouth Daily., Disp: , Rfl:   •  atorvastatin (LIPITOR) 20 MG tablet, Take 1 tablet by mouth Every Night., Disp: 90 tablet, Rfl: 3  •  carvedilol (COREG) 12.5 MG tablet, Take 1 tablet by mouth 2 (two) times a day., Disp: 180 tablet, Rfl: 3  •  Cholecalciferol (Vitamin D3) 25 MCG (1000 UT) capsule, Take 1,000 Units by mouth Daily., Disp: , Rfl:   •  hydroCHLOROthiazide (HYDRODIURIL) 12.5 MG tablet, Take 12.5 mg by mouth Daily., Disp: , Rfl:   •  lisinopril (PRINIVIL,ZESTRIL) 20 MG tablet, Take 1 tablet by mouth 2 (two) times a day., Disp: 180 tablet, Rfl: 3  •  multivitamin with minerals (MULTIVITAMIN MEN 50+ PO), Take 1 tablet by mouth Daily.,  Disp: , Rfl:   •  Zinc 50 MG capsule, Take 1 tablet by mouth Daily., Disp: , Rfl:     Social History     Socioeconomic History   • Marital status:      Spouse name: Not on file   • Number of children: Not on file   • Years of education: Not on file   • Highest education level: Not on file   Tobacco Use   • Smoking status: Never Smoker   • Smokeless tobacco: Former User     Types: Snuff   Vaping Use   • Vaping Use: Never used   Substance and Sexual Activity   • Alcohol use: Yes     Comment: couple cans of beer every night   • Drug use: Never   • Sexual activity: Defer       Family History   Problem Relation Age of Onset   • Heart failure Mother    • Stroke Father            Review of Systems:  Review of Systems   Constitutional: Positive for activity change and fatigue.   HENT: Negative.    Eyes: Negative.    Respiratory: Positive for chest tightness and shortness of breath.    Cardiovascular: Negative.    Gastrointestinal: Negative.    Endocrine: Negative.    Genitourinary: Negative.    Musculoskeletal: Negative.    Skin: Negative.    Allergic/Immunologic: Negative.    Neurological: Negative.    Hematological: Negative.    Psychiatric/Behavioral: Negative.      Cardiovascular ROS: positive for - chest pain, dyspnea on exertion and shortness of breath  Physical Exam:    Vital Signs:  Weight: 107 kg (235 lb)   Body mass index is 36.81 kg/m².  Temp: 97.8 °F (36.6 °C)   Heart Rate: 85   BP: 147/90     Vitals and nursing note reviewed.   Constitutional:       Appearance: Healthy appearance. Not in distress.   Eyes:      Conjunctiva/sclera: Conjunctivae normal.      Pupils: Pupils are equal, round, and reactive to light.   HENT:      Nose: Nose normal.    Mouth/Throat:      Pharynx: Oropharynx is clear.   Neck:      Thyroid: Thyroid normal.      Vascular: No JVR.   Pulmonary:      Effort: Pulmonary effort is normal.      Breath sounds: Normal breath sounds.   Chest:      Chest wall: Not tender to palpatation.    Cardiovascular:      PMI at left midclavicular line. Normal rate. Regular rhythm. Normal S1. Normal S2.      Murmurs: There is no murmur.      No gallop. No click. No rub.   Pulses:     Carotid: 4+ with bruit bilaterally.     Radial: 4+ bilaterally.     Femoral: 4+ bilaterally.     Dorsalis pedis: 4+ bilaterally.     Posterior tibial: 4+ bilaterally.  Abdominal:      General: Bowel sounds are normal.      Palpations: Abdomen is soft.      Comments: Protuberant   Musculoskeletal: Normal range of motion.         General: No deformity.      Extremities: No clubbing present.     Cervical back: Normal range of motion and neck supple. Skin:     General: Skin is warm and dry.      Comments: Scars on left lower leg   Neurological:      Mental Status: Alert and oriented to person, place and time.          Assessment:     Multivessel coronary disease with minimal angina but 2 occluded vessels and tight proximal LAD.            Recommendation/Plan:       Operations advisable.  We will plan surgery tomorrow.  I discussed all this with the patient and family and they understand and wish to proceed.        Thank you for allowing me to participate in his care.    Regards,    Robbin Patten MD

## 2021-08-09 NOTE — H&P (VIEW-ONLY)
8/9/2021      Subjective:          Chief Complaint: Fatigue and chest pain    History of Present Illness:       Dear Madhu and Colleagues,  It was nice to see Marcell Gunn in consultation at your request. He is a 55 y.o. male with a history of hypertension who has developed stable angina. He denies failure. The E. The Cardiac Cath that I reviewed personally shows 99% LAD.  90% small diagonal.  99% ramus intermedius.  100% distal circumflex.  60% OM1.  100% RCA.  Ejection fraction is 60%.  He underwent a CTA of the coronaries showing calcium and was referred for catheterization.  With multivessel coronary disease operation is advisable..    Patient Active Problem List   Diagnosis   • Coronary artery disease   • Chest pain       Past Medical History:   Diagnosis Date   • Coronary artery disease    • Hyperlipidemia    • Hypertension        Past Surgical History:   Procedure Laterality Date   • CARDIAC CATHETERIZATION Left 8/4/2021    Procedure: Left Cardiac Catheterization;  Surgeon: Calderon Estes MD;  Location: [facility] CATH INVASIVE LOCATION;  Service: Cardiovascular;  Laterality: Left;       No Known Allergies      Current Outpatient Medications:   •  amLODIPine (NORVASC) 5 MG tablet, Take 5 mg by mouth Daily., Disp: , Rfl:   •  aspirin 325 MG tablet, Take 325 mg by mouth Daily., Disp: , Rfl:   •  atorvastatin (LIPITOR) 20 MG tablet, Take 1 tablet by mouth Every Night., Disp: 90 tablet, Rfl: 3  •  carvedilol (COREG) 12.5 MG tablet, Take 1 tablet by mouth 2 (two) times a day., Disp: 180 tablet, Rfl: 3  •  Cholecalciferol (Vitamin D3) 25 MCG (1000 UT) capsule, Take 1,000 Units by mouth Daily., Disp: , Rfl:   •  hydroCHLOROthiazide (HYDRODIURIL) 12.5 MG tablet, Take 12.5 mg by mouth Daily., Disp: , Rfl:   •  lisinopril (PRINIVIL,ZESTRIL) 20 MG tablet, Take 1 tablet by mouth 2 (two) times a day., Disp: 180 tablet, Rfl: 3  •  multivitamin with minerals (MULTIVITAMIN MEN 50+ PO), Take 1 tablet by mouth Daily.,  Disp: , Rfl:   •  Zinc 50 MG capsule, Take 1 tablet by mouth Daily., Disp: , Rfl:     Social History     Socioeconomic History   • Marital status:      Spouse name: Not on file   • Number of children: Not on file   • Years of education: Not on file   • Highest education level: Not on file   Tobacco Use   • Smoking status: Never Smoker   • Smokeless tobacco: Former User     Types: Snuff   Vaping Use   • Vaping Use: Never used   Substance and Sexual Activity   • Alcohol use: Yes     Comment: couple cans of beer every night   • Drug use: Never   • Sexual activity: Defer       Family History   Problem Relation Age of Onset   • Heart failure Mother    • Stroke Father            Review of Systems:  Review of Systems   Constitutional: Positive for activity change and fatigue.   HENT: Negative.    Eyes: Negative.    Respiratory: Positive for chest tightness and shortness of breath.    Cardiovascular: Negative.    Gastrointestinal: Negative.    Endocrine: Negative.    Genitourinary: Negative.    Musculoskeletal: Negative.    Skin: Negative.    Allergic/Immunologic: Negative.    Neurological: Negative.    Hematological: Negative.    Psychiatric/Behavioral: Negative.      Cardiovascular ROS: positive for - chest pain, dyspnea on exertion and shortness of breath  Physical Exam:    Vital Signs:  Weight: 107 kg (235 lb)   Body mass index is 36.81 kg/m².  Temp: 97.8 °F (36.6 °C)   Heart Rate: 85   BP: 147/90     Vitals and nursing note reviewed.   Constitutional:       Appearance: Healthy appearance. Not in distress.   Eyes:      Conjunctiva/sclera: Conjunctivae normal.      Pupils: Pupils are equal, round, and reactive to light.   HENT:      Nose: Nose normal.    Mouth/Throat:      Pharynx: Oropharynx is clear.   Neck:      Thyroid: Thyroid normal.      Vascular: No JVR.   Pulmonary:      Effort: Pulmonary effort is normal.      Breath sounds: Normal breath sounds.   Chest:      Chest wall: Not tender to palpatation.    Cardiovascular:      PMI at left midclavicular line. Normal rate. Regular rhythm. Normal S1. Normal S2.      Murmurs: There is no murmur.      No gallop. No click. No rub.   Pulses:     Carotid: 4+ with bruit bilaterally.     Radial: 4+ bilaterally.     Femoral: 4+ bilaterally.     Dorsalis pedis: 4+ bilaterally.     Posterior tibial: 4+ bilaterally.  Abdominal:      General: Bowel sounds are normal.      Palpations: Abdomen is soft.      Comments: Protuberant   Musculoskeletal: Normal range of motion.         General: No deformity.      Extremities: No clubbing present.     Cervical back: Normal range of motion and neck supple. Skin:     General: Skin is warm and dry.      Comments: Scars on left lower leg   Neurological:      Mental Status: Alert and oriented to person, place and time.          Assessment:     Multivessel coronary disease with minimal angina but 2 occluded vessels and tight proximal LAD.            Recommendation/Plan:       Operations advisable.  We will plan surgery tomorrow.  I discussed all this with the patient and family and they understand and wish to proceed.        Thank you for allowing me to participate in his care.    Regards,    Robbin Patten MD

## 2021-08-09 NOTE — ANESTHESIA PREPROCEDURE EVALUATION
Anesthesia Evaluation                  Airway   Mallampati: II  TM distance: >3 FB  Neck ROM: full  Dental          Pulmonary - normal exam   Cardiovascular - normal exam    (+) hypertension, CAD, hyperlipidemia,       Neuro/Psych  GI/Hepatic/Renal/Endo    (-) liver disease, no renal disease    Musculoskeletal     Abdominal    Substance History      OB/GYN          Other                        Anesthesia Plan    ASA 4     general     Postoperative Plan: Expected vent after surgery  Anesthetic plan, all risks, benefits, and alternatives have been provided, discussed and informed consent has been obtained with: patient.

## 2021-08-10 ENCOUNTER — ANCILLARY PROCEDURE (OUTPATIENT)
Dept: PERIOP | Facility: HOSPITAL | Age: 56
End: 2021-08-10

## 2021-08-10 ENCOUNTER — APPOINTMENT (OUTPATIENT)
Dept: GENERAL RADIOLOGY | Facility: HOSPITAL | Age: 56
End: 2021-08-10

## 2021-08-10 ENCOUNTER — ANESTHESIA (OUTPATIENT)
Dept: PERIOP | Facility: HOSPITAL | Age: 56
End: 2021-08-10

## 2021-08-10 LAB
ALBUMIN SERPL-MCNC: 4.5 G/DL (ref 3.5–5.2)
ALBUMIN SERPL-MCNC: 4.6 G/DL (ref 3.5–5.2)
ANION GAP SERPL CALCULATED.3IONS-SCNC: 10.7 MMOL/L (ref 5–15)
ANION GAP SERPL CALCULATED.3IONS-SCNC: 13.9 MMOL/L (ref 5–15)
ANION GAP SERPL CALCULATED.3IONS-SCNC: 9.3 MMOL/L (ref 5–15)
APTT PPP: 30.4 SECONDS (ref 22.7–35.4)
ARTERIAL PATENCY WRIST A: ABNORMAL
ATMOSPHERIC PRESS: 753.7 MMHG
ATMOSPHERIC PRESS: 754.6 MMHG
ATMOSPHERIC PRESS: 755.4 MMHG
BASE EXCESS BLDA CALC-SCNC: 0.1 MMOL/L (ref 0–2)
BASE EXCESS BLDA CALC-SCNC: 1.1 MMOL/L (ref 0–2)
BASE EXCESS BLDA CALC-SCNC: 2 MMOL/L (ref 0–2)
BASOPHILS # BLD AUTO: 0.03 10*3/MM3 (ref 0–0.2)
BASOPHILS NFR BLD AUTO: 0.2 % (ref 0–1.5)
BDY SITE: ABNORMAL
BUN SERPL-MCNC: 15 MG/DL (ref 6–20)
BUN SERPL-MCNC: 15 MG/DL (ref 6–20)
BUN SERPL-MCNC: 16 MG/DL (ref 6–20)
BUN/CREAT SERPL: 13.4 (ref 7–25)
BUN/CREAT SERPL: 14.2 (ref 7–25)
BUN/CREAT SERPL: 19.2 (ref 7–25)
CA-I BLD-MCNC: 4.8 MG/DL (ref 4.6–5.4)
CA-I SERPL ISE-MCNC: 1.21 MMOL/L (ref 1.15–1.35)
CALCIUM SPEC-SCNC: 7.9 MG/DL (ref 8.6–10.5)
CALCIUM SPEC-SCNC: 8.3 MG/DL (ref 8.6–10.5)
CALCIUM SPEC-SCNC: 8.5 MG/DL (ref 8.6–10.5)
CHLORIDE SERPL-SCNC: 100 MMOL/L (ref 98–107)
CHLORIDE SERPL-SCNC: 104 MMOL/L (ref 98–107)
CHLORIDE SERPL-SCNC: 107 MMOL/L (ref 98–107)
CO2 SERPL-SCNC: 22.3 MMOL/L (ref 22–29)
CO2 SERPL-SCNC: 24.1 MMOL/L (ref 22–29)
CO2 SERPL-SCNC: 24.7 MMOL/L (ref 22–29)
CREAT SERPL-MCNC: 0.78 MG/DL (ref 0.76–1.27)
CREAT SERPL-MCNC: 1.06 MG/DL (ref 0.76–1.27)
CREAT SERPL-MCNC: 1.19 MG/DL (ref 0.76–1.27)
DEPRECATED RDW RBC AUTO: 45 FL (ref 37–54)
DEPRECATED RDW RBC AUTO: 46.4 FL (ref 37–54)
EOSINOPHIL # BLD AUTO: 0.06 10*3/MM3 (ref 0–0.4)
EOSINOPHIL NFR BLD AUTO: 0.4 % (ref 0.3–6.2)
ERYTHROCYTE [DISTWIDTH] IN BLOOD BY AUTOMATED COUNT: 12.7 % (ref 12.3–15.4)
ERYTHROCYTE [DISTWIDTH] IN BLOOD BY AUTOMATED COUNT: 12.9 % (ref 12.3–15.4)
FIBRINOGEN PPP-MCNC: 215 MG/DL (ref 219–464)
GFR SERPL CREATININE-BSD FRML MDRD: 104 ML/MIN/1.73
GFR SERPL CREATININE-BSD FRML MDRD: 64 ML/MIN/1.73
GFR SERPL CREATININE-BSD FRML MDRD: 73 ML/MIN/1.73
GLUCOSE BLDC GLUCOMTR-MCNC: 110 MG/DL (ref 70–130)
GLUCOSE BLDC GLUCOMTR-MCNC: 116 MG/DL (ref 70–130)
GLUCOSE BLDC GLUCOMTR-MCNC: 117 MG/DL (ref 70–130)
GLUCOSE BLDC GLUCOMTR-MCNC: 119 MG/DL (ref 70–130)
GLUCOSE BLDC GLUCOMTR-MCNC: 123 MG/DL (ref 70–130)
GLUCOSE BLDC GLUCOMTR-MCNC: 131 MG/DL (ref 70–130)
GLUCOSE BLDC GLUCOMTR-MCNC: 131 MG/DL (ref 70–130)
GLUCOSE BLDC GLUCOMTR-MCNC: 139 MG/DL (ref 70–130)
GLUCOSE BLDC GLUCOMTR-MCNC: 142 MG/DL (ref 70–130)
GLUCOSE BLDC GLUCOMTR-MCNC: 143 MG/DL (ref 70–130)
GLUCOSE SERPL-MCNC: 114 MG/DL (ref 65–99)
GLUCOSE SERPL-MCNC: 132 MG/DL (ref 65–99)
GLUCOSE SERPL-MCNC: 97 MG/DL (ref 65–99)
HCO3 BLDA-SCNC: 23.9 MMOL/L (ref 22–28)
HCO3 BLDA-SCNC: 24.8 MMOL/L (ref 22–28)
HCO3 BLDA-SCNC: 27.3 MMOL/L (ref 22–28)
HCT VFR BLD AUTO: 28.6 % (ref 37.5–51)
HCT VFR BLD AUTO: 34.9 % (ref 37.5–51)
HGB BLD-MCNC: 10 G/DL (ref 13–17.7)
HGB BLD-MCNC: 11.9 G/DL (ref 13–17.7)
IMM GRANULOCYTES # BLD AUTO: 0.09 10*3/MM3 (ref 0–0.05)
IMM GRANULOCYTES NFR BLD AUTO: 0.6 % (ref 0–0.5)
INHALED O2 CONCENTRATION: 100 %
INHALED O2 CONCENTRATION: 40 %
INHALED O2 CONCENTRATION: 55 %
INR PPP: 1.31 (ref 0.9–1.1)
LYMPHOCYTES # BLD AUTO: 1.91 10*3/MM3 (ref 0.7–3.1)
LYMPHOCYTES NFR BLD AUTO: 12 % (ref 19.6–45.3)
MAGNESIUM SERPL-MCNC: 2.5 MG/DL (ref 1.6–2.6)
MAGNESIUM SERPL-MCNC: 2.5 MG/DL (ref 1.6–2.6)
MCH RBC QN AUTO: 33.8 PG (ref 26.6–33)
MCH RBC QN AUTO: 34 PG (ref 26.6–33)
MCHC RBC AUTO-ENTMCNC: 34.1 G/DL (ref 31.5–35.7)
MCHC RBC AUTO-ENTMCNC: 35 G/DL (ref 31.5–35.7)
MCV RBC AUTO: 96.6 FL (ref 79–97)
MCV RBC AUTO: 99.7 FL (ref 79–97)
MODALITY: ABNORMAL
MONOCYTES # BLD AUTO: 1.02 10*3/MM3 (ref 0.1–0.9)
MONOCYTES NFR BLD AUTO: 6.4 % (ref 5–12)
NEUTROPHILS NFR BLD AUTO: 12.79 10*3/MM3 (ref 1.7–7)
NEUTROPHILS NFR BLD AUTO: 80.4 % (ref 42.7–76)
NRBC BLD AUTO-RTO: 0 /100 WBC (ref 0–0.2)
O2 A-A PPRESDIFF RESPIRATORY: 0.2 MMHG
O2 A-A PPRESDIFF RESPIRATORY: 0.2 MMHG
O2 A-A PPRESDIFF RESPIRATORY: 0.4 MMHG
PCO2 BLDA: 31.6 MM HG (ref 35–45)
PCO2 BLDA: 34.8 MM HG (ref 35–45)
PCO2 BLDA: 49.3 MM HG (ref 35–45)
PEEP RESPIRATORY: 7.5 CM[H2O]
PH BLDA: 7.35 PH UNITS (ref 7.35–7.45)
PH BLDA: 7.45 PH UNITS (ref 7.35–7.45)
PH BLDA: 7.5 PH UNITS (ref 7.35–7.45)
PHOSPHATE SERPL-MCNC: 1.4 MG/DL (ref 2.5–4.5)
PHOSPHATE SERPL-MCNC: 2.6 MG/DL (ref 2.5–4.5)
PLATELET # BLD AUTO: 130 10*3/MM3 (ref 140–450)
PLATELET # BLD AUTO: 162 10*3/MM3 (ref 140–450)
PMV BLD AUTO: 9.4 FL (ref 6–12)
PMV BLD AUTO: 9.7 FL (ref 6–12)
PO2 BLDA: 160.2 MM HG (ref 80–100)
PO2 BLDA: 82.7 MM HG (ref 80–100)
PO2 BLDA: 95.8 MM HG (ref 80–100)
POTASSIUM SERPL-SCNC: 3.8 MMOL/L (ref 3.5–5.2)
POTASSIUM SERPL-SCNC: 4 MMOL/L (ref 3.5–5.2)
POTASSIUM SERPL-SCNC: 4.1 MMOL/L (ref 3.5–5.2)
PROTHROMBIN TIME: 16.1 SECONDS (ref 11.7–14.2)
QT INTERVAL: 391 MS
QT INTERVAL: 465 MS
RBC # BLD AUTO: 2.96 10*6/MM3 (ref 4.14–5.8)
RBC # BLD AUTO: 3.5 10*6/MM3 (ref 4.14–5.8)
SAO2 % BLDCOA: 96.7 % (ref 92–99)
SAO2 % BLDCOA: 98.2 % (ref 92–99)
SAO2 % BLDCOA: 99.3 % (ref 92–99)
SET MECH RESP RATE: 14
SODIUM SERPL-SCNC: 138 MMOL/L (ref 136–145)
SODIUM SERPL-SCNC: 138 MMOL/L (ref 136–145)
SODIUM SERPL-SCNC: 140 MMOL/L (ref 136–145)
TOTAL RATE: 14 BREATHS/MINUTE
VENTILATOR MODE: ABNORMAL
VT ON VENT VENT: 600 ML
VT ON VENT VENT: 800 ML
VT ON VENT VENT: 800 ML
WBC # BLD AUTO: 15.9 10*3/MM3 (ref 3.4–10.8)
WBC # BLD AUTO: 9.2 10*3/MM3 (ref 3.4–10.8)

## 2021-08-10 PROCEDURE — P9047 ALBUMIN (HUMAN), 25%, 50ML: HCPCS

## 2021-08-10 PROCEDURE — 86900 BLOOD TYPING SEROLOGIC ABO: CPT

## 2021-08-10 PROCEDURE — 25010000002 MAGNESIUM SULFATE IN D5W 1G/100ML (PREMIX) 1-5 GM/100ML-% SOLUTION: Performed by: NURSE PRACTITIONER

## 2021-08-10 PROCEDURE — 25010000002 AMIODARONE IN DEXTROSE 5% 360-4.14 MG/200ML-% SOLUTION: Performed by: THORACIC SURGERY (CARDIOTHORACIC VASCULAR SURGERY)

## 2021-08-10 PROCEDURE — A4648 IMPLANTABLE TISSUE MARKER: HCPCS | Performed by: THORACIC SURGERY (CARDIOTHORACIC VASCULAR SURGERY)

## 2021-08-10 PROCEDURE — 94799 UNLISTED PULMONARY SVC/PX: CPT

## 2021-08-10 PROCEDURE — 25010000002 PROPOFOL 10 MG/ML EMULSION: Performed by: ANESTHESIOLOGY

## 2021-08-10 PROCEDURE — 82330 ASSAY OF CALCIUM: CPT | Performed by: NURSE PRACTITIONER

## 2021-08-10 PROCEDURE — 82803 BLOOD GASES ANY COMBINATION: CPT

## 2021-08-10 PROCEDURE — 33534 CABG ARTERIAL TWO: CPT | Performed by: SPECIALIST/TECHNOLOGIST, OTHER

## 2021-08-10 PROCEDURE — 33508 ENDOSCOPIC VEIN HARVEST: CPT | Performed by: SPECIALIST/TECHNOLOGIST, OTHER

## 2021-08-10 PROCEDURE — 25010000002 PHENYLEPHRINE PER 1 ML: Performed by: ANESTHESIOLOGY

## 2021-08-10 PROCEDURE — 93005 ELECTROCARDIOGRAM TRACING: CPT | Performed by: THORACIC SURGERY (CARDIOTHORACIC VASCULAR SURGERY)

## 2021-08-10 PROCEDURE — 25010000003 CEFAZOLIN IN DEXTROSE 2-4 GM/100ML-% SOLUTION: Performed by: NURSE PRACTITIONER

## 2021-08-10 PROCEDURE — 25010000002 HEPARIN (PORCINE) PER 1000 UNITS: Performed by: THORACIC SURGERY (CARDIOTHORACIC VASCULAR SURGERY)

## 2021-08-10 PROCEDURE — 25010000002 AMIODARONE IN DEXTROSE 5% 360-4.14 MG/200ML-% SOLUTION: Performed by: ANESTHESIOLOGY

## 2021-08-10 PROCEDURE — 25010000002 FUROSEMIDE PER 20 MG

## 2021-08-10 PROCEDURE — C1751 CATH, INF, PER/CENT/MIDLINE: HCPCS | Performed by: ANESTHESIOLOGY

## 2021-08-10 PROCEDURE — 80048 BASIC METABOLIC PNL TOTAL CA: CPT | Performed by: THORACIC SURGERY (CARDIOTHORACIC VASCULAR SURGERY)

## 2021-08-10 PROCEDURE — 33523 CABG ART-VEIN SIX OR MORE: CPT | Performed by: THORACIC SURGERY (CARDIOTHORACIC VASCULAR SURGERY)

## 2021-08-10 PROCEDURE — 25010000002 ALBUMIN HUMAN 25% PER 50 ML

## 2021-08-10 PROCEDURE — 25010000003 POTASSIUM CHLORIDE PER 2 MEQ: Performed by: NURSE PRACTITIONER

## 2021-08-10 PROCEDURE — B24BZZ4 ULTRASONOGRAPHY OF HEART WITH AORTA, TRANSESOPHAGEAL: ICD-10-PCS | Performed by: ANESTHESIOLOGY

## 2021-08-10 PROCEDURE — 25010000002 ONDANSETRON PER 1 MG: Performed by: ANESTHESIOLOGY

## 2021-08-10 PROCEDURE — 82947 ASSAY GLUCOSE BLOOD QUANT: CPT

## 2021-08-10 PROCEDURE — C1729 CATH, DRAINAGE: HCPCS | Performed by: THORACIC SURGERY (CARDIOTHORACIC VASCULAR SURGERY)

## 2021-08-10 PROCEDURE — 80069 RENAL FUNCTION PANEL: CPT | Performed by: NURSE PRACTITIONER

## 2021-08-10 PROCEDURE — 02HP32Z INSERTION OF MONITORING DEVICE INTO PULMONARY TRUNK, PERCUTANEOUS APPROACH: ICD-10-PCS | Performed by: ANESTHESIOLOGY

## 2021-08-10 PROCEDURE — 85347 COAGULATION TIME ACTIVATED: CPT

## 2021-08-10 PROCEDURE — 85730 THROMBOPLASTIN TIME PARTIAL: CPT | Performed by: NURSE PRACTITIONER

## 2021-08-10 PROCEDURE — 4A1239Z MONITORING OF CARDIAC OUTPUT, PERCUTANEOUS APPROACH: ICD-10-PCS | Performed by: ANESTHESIOLOGY

## 2021-08-10 PROCEDURE — 93318 ECHO TRANSESOPHAGEAL INTRAOP: CPT | Performed by: ANESTHESIOLOGY

## 2021-08-10 PROCEDURE — 25010000002 PROPOFOL 10 MG/ML EMULSION: Performed by: NURSE PRACTITIONER

## 2021-08-10 PROCEDURE — 021309W BYPASS CORONARY ARTERY, FOUR OR MORE ARTERIES FROM AORTA WITH AUTOLOGOUS VENOUS TISSUE, OPEN APPROACH: ICD-10-PCS | Performed by: THORACIC SURGERY (CARDIOTHORACIC VASCULAR SURGERY)

## 2021-08-10 PROCEDURE — 85014 HEMATOCRIT: CPT

## 2021-08-10 PROCEDURE — 63710000001 INSULIN REGULAR HUMAN PER 5 UNITS: Performed by: ANESTHESIOLOGY

## 2021-08-10 PROCEDURE — 86901 BLOOD TYPING SEROLOGIC RH(D): CPT

## 2021-08-10 PROCEDURE — 5A1221Z PERFORMANCE OF CARDIAC OUTPUT, CONTINUOUS: ICD-10-PCS | Performed by: THORACIC SURGERY (CARDIOTHORACIC VASCULAR SURGERY)

## 2021-08-10 PROCEDURE — 06BQ4ZZ EXCISION OF LEFT SAPHENOUS VEIN, PERCUTANEOUS ENDOSCOPIC APPROACH: ICD-10-PCS | Performed by: THORACIC SURGERY (CARDIOTHORACIC VASCULAR SURGERY)

## 2021-08-10 PROCEDURE — 4A133B3 MONITORING OF ARTERIAL PRESSURE, PULMONARY, PERCUTANEOUS APPROACH: ICD-10-PCS | Performed by: ANESTHESIOLOGY

## 2021-08-10 PROCEDURE — 85384 FIBRINOGEN ACTIVITY: CPT | Performed by: NURSE PRACTITIONER

## 2021-08-10 PROCEDURE — 25010000002 METOCLOPRAMIDE PER 10 MG: Performed by: NURSE PRACTITIONER

## 2021-08-10 PROCEDURE — 25010000002 MORPHINE PER 10 MG: Performed by: NURSE PRACTITIONER

## 2021-08-10 PROCEDURE — 85025 COMPLETE CBC W/AUTO DIFF WBC: CPT | Performed by: NURSE PRACTITIONER

## 2021-08-10 PROCEDURE — P9041 ALBUMIN (HUMAN),5%, 50ML: HCPCS | Performed by: NURSE PRACTITIONER

## 2021-08-10 PROCEDURE — 25010000003 PROTAMINE SULFATE PER 10 MG: Performed by: THORACIC SURGERY (CARDIOTHORACIC VASCULAR SURGERY)

## 2021-08-10 PROCEDURE — 25010000002 MIDAZOLAM PER 1 MG: Performed by: ANESTHESIOLOGY

## 2021-08-10 PROCEDURE — 85018 HEMOGLOBIN: CPT

## 2021-08-10 PROCEDURE — 71045 X-RAY EXAM CHEST 1 VIEW: CPT

## 2021-08-10 PROCEDURE — 25010000002 PAPAVERINE PER 60 MG: Performed by: THORACIC SURGERY (CARDIOTHORACIC VASCULAR SURGERY)

## 2021-08-10 PROCEDURE — 33508 ENDOSCOPIC VEIN HARVEST: CPT | Performed by: THORACIC SURGERY (CARDIOTHORACIC VASCULAR SURGERY)

## 2021-08-10 PROCEDURE — 83735 ASSAY OF MAGNESIUM: CPT | Performed by: NURSE PRACTITIONER

## 2021-08-10 PROCEDURE — 93010 ELECTROCARDIOGRAM REPORT: CPT | Performed by: INTERNAL MEDICINE

## 2021-08-10 PROCEDURE — 85027 COMPLETE CBC AUTOMATED: CPT | Performed by: NURSE PRACTITIONER

## 2021-08-10 PROCEDURE — 82962 GLUCOSE BLOOD TEST: CPT

## 2021-08-10 PROCEDURE — 02110Z9 BYPASS CORONARY ARTERY, TWO ARTERIES FROM LEFT INTERNAL MAMMARY, OPEN APPROACH: ICD-10-PCS | Performed by: THORACIC SURGERY (CARDIOTHORACIC VASCULAR SURGERY)

## 2021-08-10 PROCEDURE — 85610 PROTHROMBIN TIME: CPT | Performed by: NURSE PRACTITIONER

## 2021-08-10 PROCEDURE — 25010000002 HEPARIN (PORCINE) PER 1000 UNITS

## 2021-08-10 PROCEDURE — 33534 CABG ARTERIAL TWO: CPT | Performed by: THORACIC SURGERY (CARDIOTHORACIC VASCULAR SURGERY)

## 2021-08-10 PROCEDURE — 33523 CABG ART-VEIN SIX OR MORE: CPT | Performed by: SPECIALIST/TECHNOLOGIST, OTHER

## 2021-08-10 PROCEDURE — C1713 ANCHOR/SCREW BN/BN,TIS/BN: HCPCS | Performed by: THORACIC SURGERY (CARDIOTHORACIC VASCULAR SURGERY)

## 2021-08-10 PROCEDURE — 94002 VENT MGMT INPAT INIT DAY: CPT

## 2021-08-10 PROCEDURE — 25010000002 ALBUMIN HUMAN 5% PER 50 ML: Performed by: NURSE PRACTITIONER

## 2021-08-10 PROCEDURE — 25010000002 HEPARIN (PORCINE) PER 1000 UNITS: Performed by: ANESTHESIOLOGY

## 2021-08-10 PROCEDURE — 25010000002 FENTANYL CITRATE (PF) 50 MCG/ML SOLUTION: Performed by: ANESTHESIOLOGY

## 2021-08-10 PROCEDURE — C1889 IMPLANT/INSERT DEVICE, NOC: HCPCS | Performed by: THORACIC SURGERY (CARDIOTHORACIC VASCULAR SURGERY)

## 2021-08-10 DEVICE — SS SUTURE, 3 PER SLEEVE
Type: IMPLANTABLE DEVICE | Site: STERNUM | Status: FUNCTIONAL
Brand: MYO/WIRE II

## 2021-08-10 DEVICE — ABSORBABLE HEMOSTAT (OXIDIZED REGENERATED CELLULOSE, U.S.P.)
Type: IMPLANTABLE DEVICE | Site: HEART | Status: FUNCTIONAL
Brand: SURGICEL

## 2021-08-10 DEVICE — SS SUTURE, 4 PER SLEEVE
Type: IMPLANTABLE DEVICE | Site: STERNUM | Status: FUNCTIONAL
Brand: MYO/WIRE II

## 2021-08-10 RX ORDER — SODIUM CHLORIDE 9 MG/ML
30 INJECTION, SOLUTION INTRAVENOUS CONTINUOUS
Status: DISCONTINUED | OUTPATIENT
Start: 2021-08-10 | End: 2021-08-15 | Stop reason: HOSPADM

## 2021-08-10 RX ORDER — ACETAMINOPHEN 160 MG/5ML
650 SOLUTION ORAL EVERY 4 HOURS
Status: DISCONTINUED | OUTPATIENT
Start: 2021-08-10 | End: 2021-08-11

## 2021-08-10 RX ORDER — NITROGLYCERIN 20 MG/100ML
5-200 INJECTION INTRAVENOUS
Status: DISCONTINUED | OUTPATIENT
Start: 2021-08-10 | End: 2021-08-11

## 2021-08-10 RX ORDER — ACETAMINOPHEN 325 MG/1
650 TABLET ORAL EVERY 4 HOURS PRN
Status: DISCONTINUED | OUTPATIENT
Start: 2021-08-11 | End: 2021-08-15 | Stop reason: HOSPADM

## 2021-08-10 RX ORDER — PANTOPRAZOLE SODIUM 40 MG/10ML
40 INJECTION, POWDER, LYOPHILIZED, FOR SOLUTION INTRAVENOUS ONCE
Status: COMPLETED | OUTPATIENT
Start: 2021-08-10 | End: 2021-08-10

## 2021-08-10 RX ORDER — NOREPINEPHRINE BIT/0.9 % NACL 8 MG/250ML
.02-.3 INFUSION BOTTLE (ML) INTRAVENOUS CONTINUOUS PRN
Status: DISCONTINUED | OUTPATIENT
Start: 2021-08-10 | End: 2021-08-11

## 2021-08-10 RX ORDER — ALBUMIN, HUMAN INJ 5% 5 %
1500 SOLUTION INTRAVENOUS AS NEEDED
Status: DISPENSED | OUTPATIENT
Start: 2021-08-10 | End: 2021-08-11

## 2021-08-10 RX ORDER — POTASSIUM CHLORIDE 1.5 G/1.77G
40 POWDER, FOR SOLUTION ORAL AS NEEDED
Status: DISCONTINUED | OUTPATIENT
Start: 2021-08-10 | End: 2021-08-15 | Stop reason: HOSPADM

## 2021-08-10 RX ORDER — SODIUM CHLORIDE 9 MG/ML
INJECTION, SOLUTION INTRAVENOUS CONTINUOUS PRN
Status: DISCONTINUED | OUTPATIENT
Start: 2021-08-10 | End: 2021-08-10 | Stop reason: SURG

## 2021-08-10 RX ORDER — PROTAMINE SULFATE 10 MG/ML
INJECTION, SOLUTION INTRAVENOUS AS NEEDED
Status: DISCONTINUED | OUTPATIENT
Start: 2021-08-10 | End: 2021-08-10 | Stop reason: HOSPADM

## 2021-08-10 RX ORDER — NOREPINEPHRINE BITARTRATE 1 MG/ML
INJECTION, SOLUTION INTRAVENOUS CONTINUOUS PRN
Status: DISCONTINUED | OUTPATIENT
Start: 2021-08-10 | End: 2021-08-10 | Stop reason: SURG

## 2021-08-10 RX ORDER — MORPHINE SULFATE 2 MG/ML
1 INJECTION, SOLUTION INTRAMUSCULAR; INTRAVENOUS EVERY 4 HOURS PRN
Status: DISCONTINUED | OUTPATIENT
Start: 2021-08-10 | End: 2021-08-15 | Stop reason: HOSPADM

## 2021-08-10 RX ORDER — ACETAMINOPHEN 160 MG/5ML
650 SOLUTION ORAL EVERY 4 HOURS PRN
Status: DISCONTINUED | OUTPATIENT
Start: 2021-08-11 | End: 2021-08-15 | Stop reason: HOSPADM

## 2021-08-10 RX ORDER — SUFENTANIL CITRATE 50 UG/ML
INJECTION EPIDURAL; INTRAVENOUS AS NEEDED
Status: DISCONTINUED | OUTPATIENT
Start: 2021-08-10 | End: 2021-08-10 | Stop reason: SURG

## 2021-08-10 RX ORDER — MIDAZOLAM HYDROCHLORIDE 1 MG/ML
2 INJECTION INTRAMUSCULAR; INTRAVENOUS
Status: DISCONTINUED | OUTPATIENT
Start: 2021-08-10 | End: 2021-08-11

## 2021-08-10 RX ORDER — OXYCODONE HYDROCHLORIDE 5 MG/1
10 TABLET ORAL EVERY 4 HOURS PRN
Status: DISCONTINUED | OUTPATIENT
Start: 2021-08-10 | End: 2021-08-15 | Stop reason: HOSPADM

## 2021-08-10 RX ORDER — ONDANSETRON 2 MG/ML
4 INJECTION INTRAMUSCULAR; INTRAVENOUS EVERY 6 HOURS PRN
Status: DISCONTINUED | OUTPATIENT
Start: 2021-08-10 | End: 2021-08-15 | Stop reason: HOSPADM

## 2021-08-10 RX ORDER — POTASSIUM CHLORIDE 29.8 MG/ML
20 INJECTION INTRAVENOUS
Status: DISCONTINUED | OUTPATIENT
Start: 2021-08-10 | End: 2021-08-15 | Stop reason: HOSPADM

## 2021-08-10 RX ORDER — ACETAMINOPHEN 650 MG/1
650 SUPPOSITORY RECTAL EVERY 4 HOURS
Status: DISCONTINUED | OUTPATIENT
Start: 2021-08-10 | End: 2021-08-11

## 2021-08-10 RX ORDER — PAPAVERINE HYDROCHLORIDE 30 MG/ML
INJECTION INTRAMUSCULAR; INTRAVENOUS AS NEEDED
Status: DISCONTINUED | OUTPATIENT
Start: 2021-08-10 | End: 2021-08-10 | Stop reason: HOSPADM

## 2021-08-10 RX ORDER — DEXMEDETOMIDINE HYDROCHLORIDE 100 UG/ML
INJECTION, SOLUTION, CONCENTRATE INTRAVENOUS
Status: COMPLETED
Start: 2021-08-10 | End: 2021-08-10

## 2021-08-10 RX ORDER — POLYETHYLENE GLYCOL 3350 17 G/17G
17 POWDER, FOR SOLUTION ORAL DAILY PRN
Status: DISCONTINUED | OUTPATIENT
Start: 2021-08-10 | End: 2021-08-15 | Stop reason: HOSPADM

## 2021-08-10 RX ORDER — HEPARIN SODIUM 5000 [USP'U]/ML
INJECTION, SOLUTION INTRAVENOUS; SUBCUTANEOUS AS NEEDED
Status: DISCONTINUED | OUTPATIENT
Start: 2021-08-10 | End: 2021-08-10 | Stop reason: HOSPADM

## 2021-08-10 RX ORDER — ACETAMINOPHEN 650 MG/1
650 SUPPOSITORY RECTAL EVERY 4 HOURS PRN
Status: DISCONTINUED | OUTPATIENT
Start: 2021-08-11 | End: 2021-08-15 | Stop reason: HOSPADM

## 2021-08-10 RX ORDER — ALPRAZOLAM 0.25 MG/1
0.25 TABLET ORAL EVERY 8 HOURS PRN
Status: DISCONTINUED | OUTPATIENT
Start: 2021-08-10 | End: 2021-08-15 | Stop reason: HOSPADM

## 2021-08-10 RX ORDER — BISACODYL 10 MG
10 SUPPOSITORY, RECTAL RECTAL DAILY PRN
Status: DISCONTINUED | OUTPATIENT
Start: 2021-08-11 | End: 2021-08-15 | Stop reason: HOSPADM

## 2021-08-10 RX ORDER — MILRINONE LACTATE 0.2 MG/ML
.25-.375 INJECTION, SOLUTION INTRAVENOUS CONTINUOUS PRN
Status: DISCONTINUED | OUTPATIENT
Start: 2021-08-10 | End: 2021-08-11

## 2021-08-10 RX ORDER — MAGNESIUM SULFATE 1 G/100ML
1 INJECTION INTRAVENOUS EVERY 8 HOURS
Status: COMPLETED | OUTPATIENT
Start: 2021-08-10 | End: 2021-08-11

## 2021-08-10 RX ORDER — PROPOFOL 10 MG/ML
VIAL (ML) INTRAVENOUS AS NEEDED
Status: DISCONTINUED | OUTPATIENT
Start: 2021-08-10 | End: 2021-08-10 | Stop reason: SURG

## 2021-08-10 RX ORDER — FENTANYL CITRATE 50 UG/ML
INJECTION, SOLUTION INTRAMUSCULAR; INTRAVENOUS AS NEEDED
Status: DISCONTINUED | OUTPATIENT
Start: 2021-08-10 | End: 2021-08-10 | Stop reason: SURG

## 2021-08-10 RX ORDER — ACETAMINOPHEN 325 MG/1
650 TABLET ORAL EVERY 4 HOURS
Status: DISCONTINUED | OUTPATIENT
Start: 2021-08-10 | End: 2021-08-11

## 2021-08-10 RX ORDER — ATORVASTATIN CALCIUM 20 MG/1
40 TABLET, FILM COATED ORAL NIGHTLY
Status: DISCONTINUED | OUTPATIENT
Start: 2021-08-10 | End: 2021-08-15 | Stop reason: HOSPADM

## 2021-08-10 RX ORDER — DEXMEDETOMIDINE HYDROCHLORIDE 4 UG/ML
INJECTION, SOLUTION INTRAVENOUS CONTINUOUS PRN
Status: DISCONTINUED | OUTPATIENT
Start: 2021-08-10 | End: 2021-08-10 | Stop reason: SURG

## 2021-08-10 RX ORDER — POTASSIUM CHLORIDE 7.45 MG/ML
10 INJECTION INTRAVENOUS
Status: DISCONTINUED | OUTPATIENT
Start: 2021-08-10 | End: 2021-08-15 | Stop reason: HOSPADM

## 2021-08-10 RX ORDER — HEPARIN SODIUM 1000 [USP'U]/ML
INJECTION, SOLUTION INTRAVENOUS; SUBCUTANEOUS AS NEEDED
Status: DISCONTINUED | OUTPATIENT
Start: 2021-08-10 | End: 2021-08-10 | Stop reason: SURG

## 2021-08-10 RX ORDER — MORPHINE SULFATE 2 MG/ML
4 INJECTION, SOLUTION INTRAMUSCULAR; INTRAVENOUS
Status: DISCONTINUED | OUTPATIENT
Start: 2021-08-10 | End: 2021-08-15 | Stop reason: HOSPADM

## 2021-08-10 RX ORDER — NALOXONE HCL 0.4 MG/ML
0.4 VIAL (ML) INJECTION
Status: DISCONTINUED | OUTPATIENT
Start: 2021-08-10 | End: 2021-08-15 | Stop reason: HOSPADM

## 2021-08-10 RX ORDER — BISACODYL 5 MG/1
10 TABLET, DELAYED RELEASE ORAL DAILY PRN
Status: DISCONTINUED | OUTPATIENT
Start: 2021-08-10 | End: 2021-08-15 | Stop reason: HOSPADM

## 2021-08-10 RX ORDER — AMOXICILLIN 250 MG
2 CAPSULE ORAL NIGHTLY
Status: DISCONTINUED | OUTPATIENT
Start: 2021-08-11 | End: 2021-08-15 | Stop reason: HOSPADM

## 2021-08-10 RX ORDER — POTASSIUM CHLORIDE 29.8 MG/ML
20 INJECTION INTRAVENOUS
Status: COMPLETED | OUTPATIENT
Start: 2021-08-10 | End: 2021-08-10

## 2021-08-10 RX ORDER — METOCLOPRAMIDE HYDROCHLORIDE 5 MG/ML
10 INJECTION INTRAMUSCULAR; INTRAVENOUS EVERY 6 HOURS
Status: DISCONTINUED | OUTPATIENT
Start: 2021-08-10 | End: 2021-08-11

## 2021-08-10 RX ORDER — PROPOFOL 10 MG/ML
VIAL (ML) INTRAVENOUS CONTINUOUS PRN
Status: DISCONTINUED | OUTPATIENT
Start: 2021-08-10 | End: 2021-08-10 | Stop reason: SURG

## 2021-08-10 RX ORDER — CHLORHEXIDINE GLUCONATE 0.12 MG/ML
15 RINSE ORAL EVERY 12 HOURS
Status: DISCONTINUED | OUTPATIENT
Start: 2021-08-10 | End: 2021-08-15 | Stop reason: HOSPADM

## 2021-08-10 RX ORDER — PHENYLEPHRINE HCL IN 0.9% NACL 0.5 MG/5ML
.2-2 SYRINGE (ML) INTRAVENOUS CONTINUOUS PRN
Status: DISCONTINUED | OUTPATIENT
Start: 2021-08-10 | End: 2021-08-11

## 2021-08-10 RX ORDER — SODIUM CHLORIDE 0.9 % (FLUSH) 0.9 %
30 SYRINGE (ML) INJECTION ONCE AS NEEDED
Status: DISCONTINUED | OUTPATIENT
Start: 2021-08-10 | End: 2021-08-15 | Stop reason: HOSPADM

## 2021-08-10 RX ORDER — MEPERIDINE HYDROCHLORIDE 25 MG/ML
25 INJECTION INTRAMUSCULAR; INTRAVENOUS; SUBCUTANEOUS EVERY 4 HOURS PRN
Status: ACTIVE | OUTPATIENT
Start: 2021-08-10 | End: 2021-08-10

## 2021-08-10 RX ORDER — MIDAZOLAM HYDROCHLORIDE 1 MG/ML
INJECTION INTRAMUSCULAR; INTRAVENOUS AS NEEDED
Status: DISCONTINUED | OUTPATIENT
Start: 2021-08-10 | End: 2021-08-10 | Stop reason: SURG

## 2021-08-10 RX ORDER — AMINOCAPROIC ACID 250 MG/ML
INJECTION, SOLUTION INTRAVENOUS AS NEEDED
Status: DISCONTINUED | OUTPATIENT
Start: 2021-08-10 | End: 2021-08-10 | Stop reason: SURG

## 2021-08-10 RX ORDER — DOPAMINE HYDROCHLORIDE 160 MG/100ML
2-20 INJECTION, SOLUTION INTRAVENOUS CONTINUOUS PRN
Status: DISCONTINUED | OUTPATIENT
Start: 2021-08-10 | End: 2021-08-11

## 2021-08-10 RX ORDER — ROCURONIUM BROMIDE 10 MG/ML
INJECTION, SOLUTION INTRAVENOUS AS NEEDED
Status: DISCONTINUED | OUTPATIENT
Start: 2021-08-10 | End: 2021-08-10 | Stop reason: SURG

## 2021-08-10 RX ORDER — SODIUM CHLORIDE 9 MG/ML
30 INJECTION, SOLUTION INTRAVENOUS CONTINUOUS PRN
Status: DISCONTINUED | OUTPATIENT
Start: 2021-08-10 | End: 2021-08-15 | Stop reason: HOSPADM

## 2021-08-10 RX ORDER — POTASSIUM CHLORIDE 750 MG/1
40 TABLET, FILM COATED, EXTENDED RELEASE ORAL AS NEEDED
Status: DISCONTINUED | OUTPATIENT
Start: 2021-08-10 | End: 2021-08-15 | Stop reason: HOSPADM

## 2021-08-10 RX ORDER — ONDANSETRON 2 MG/ML
INJECTION INTRAMUSCULAR; INTRAVENOUS AS NEEDED
Status: DISCONTINUED | OUTPATIENT
Start: 2021-08-10 | End: 2021-08-10 | Stop reason: SURG

## 2021-08-10 RX ORDER — ASPIRIN 81 MG/1
81 TABLET ORAL DAILY
Status: DISCONTINUED | OUTPATIENT
Start: 2021-08-11 | End: 2021-08-15 | Stop reason: HOSPADM

## 2021-08-10 RX ORDER — PANTOPRAZOLE SODIUM 40 MG/1
40 TABLET, DELAYED RELEASE ORAL EVERY MORNING
Status: DISCONTINUED | OUTPATIENT
Start: 2021-08-11 | End: 2021-08-15 | Stop reason: HOSPADM

## 2021-08-10 RX ORDER — HYDROCODONE BITARTRATE AND ACETAMINOPHEN 5; 325 MG/1; MG/1
2 TABLET ORAL EVERY 4 HOURS PRN
Status: DISCONTINUED | OUTPATIENT
Start: 2021-08-10 | End: 2021-08-15 | Stop reason: HOSPADM

## 2021-08-10 RX ORDER — CEFAZOLIN SODIUM 2 G/100ML
2 INJECTION, SOLUTION INTRAVENOUS EVERY 8 HOURS
Status: COMPLETED | OUTPATIENT
Start: 2021-08-10 | End: 2021-08-12

## 2021-08-10 RX ORDER — CYCLOBENZAPRINE HCL 10 MG
10 TABLET ORAL EVERY 8 HOURS PRN
Status: DISCONTINUED | OUTPATIENT
Start: 2021-08-11 | End: 2021-08-15 | Stop reason: HOSPADM

## 2021-08-10 RX ADMIN — FENTANYL CITRATE 50 MCG: 50 INJECTION INTRAMUSCULAR; INTRAVENOUS at 06:43

## 2021-08-10 RX ADMIN — PROPOFOL 50 MCG/KG/MIN: 10 INJECTION, EMULSION INTRAVENOUS at 13:36

## 2021-08-10 RX ADMIN — AMIODARONE HYDROCHLORIDE 0.5 MG/MIN: 1.8 INJECTION, SOLUTION INTRAVENOUS at 16:40

## 2021-08-10 RX ADMIN — MORPHINE SULFATE 4 MG: 2 INJECTION, SOLUTION INTRAMUSCULAR; INTRAVENOUS at 16:43

## 2021-08-10 RX ADMIN — POTASSIUM CHLORIDE 20 MEQ: 29.8 INJECTION, SOLUTION INTRAVENOUS at 13:24

## 2021-08-10 RX ADMIN — ROCURONIUM BROMIDE 20 MG: 50 INJECTION INTRAVENOUS at 09:57

## 2021-08-10 RX ADMIN — FENTANYL CITRATE 50 MCG: 50 INJECTION INTRAMUSCULAR; INTRAVENOUS at 06:51

## 2021-08-10 RX ADMIN — PHENYLEPHRINE HYDROCHLORIDE 0.5 MCG/KG/MIN: 10 INJECTION, SOLUTION INTRAMUSCULAR; INTRAVENOUS; SUBCUTANEOUS at 06:53

## 2021-08-10 RX ADMIN — DEXMEDETOMIDINE HYDROCHLORIDE 0.8 MCG/KG/HR: 100 INJECTION, SOLUTION, CONCENTRATE INTRAVENOUS at 12:24

## 2021-08-10 RX ADMIN — DEXMEDETOMIDINE HYDROCHLORIDE 1 MCG/KG/HR: 4 INJECTION, SOLUTION INTRAVENOUS at 06:44

## 2021-08-10 RX ADMIN — SODIUM CHLORIDE 30 ML/HR: 9 INJECTION, SOLUTION INTRAVENOUS at 12:05

## 2021-08-10 RX ADMIN — HEPARIN SODIUM 30000 UNITS: 1000 INJECTION INTRAVENOUS; SUBCUTANEOUS at 08:17

## 2021-08-10 RX ADMIN — SUFENTANIL CITRATE 25 MCG: 50 INJECTION, SOLUTION EPIDURAL; INTRAVENOUS at 09:20

## 2021-08-10 RX ADMIN — DEXMEDETOMIDINE HYDROCHLORIDE 400 MCG: 100 INJECTION, SOLUTION, CONCENTRATE INTRAVENOUS at 16:42

## 2021-08-10 RX ADMIN — SUFENTANIL CITRATE 25 MCG: 50 INJECTION, SOLUTION EPIDURAL; INTRAVENOUS at 08:40

## 2021-08-10 RX ADMIN — SUFENTANIL CITRATE 25 MCG: 50 INJECTION, SOLUTION EPIDURAL; INTRAVENOUS at 07:41

## 2021-08-10 RX ADMIN — SUFENTANIL CITRATE 25 MCG: 50 INJECTION, SOLUTION EPIDURAL; INTRAVENOUS at 07:52

## 2021-08-10 RX ADMIN — CEFAZOLIN SODIUM 2 G: 2 INJECTION, SOLUTION INTRAVENOUS at 07:15

## 2021-08-10 RX ADMIN — CHLORHEXIDINE GLUCONATE 15 ML: 1.2 RINSE ORAL at 21:54

## 2021-08-10 RX ADMIN — PROPOFOL 200 MG: 10 INJECTION, EMULSION INTRAVENOUS at 06:53

## 2021-08-10 RX ADMIN — SUFENTANIL CITRATE 25 MCG: 50 INJECTION, SOLUTION EPIDURAL; INTRAVENOUS at 07:01

## 2021-08-10 RX ADMIN — SODIUM CHLORIDE 2.4 UNITS/HR: 9 INJECTION, SOLUTION INTRAVENOUS at 08:03

## 2021-08-10 RX ADMIN — CARVEDILOL 12.5 MG: 12.5 TABLET, FILM COATED ORAL at 03:33

## 2021-08-10 RX ADMIN — MAGNESIUM SULFATE HEPTAHYDRATE 1 G: 1 INJECTION, SOLUTION INTRAVENOUS at 12:25

## 2021-08-10 RX ADMIN — PROPOFOL 50 MCG/KG/MIN: 10 INJECTION, EMULSION INTRAVENOUS at 23:53

## 2021-08-10 RX ADMIN — AMIODARONE HYDROCHLORIDE 1 MG/MIN: 1.8 INJECTION, SOLUTION INTRAVENOUS at 10:43

## 2021-08-10 RX ADMIN — ONDANSETRON 4 MG: 2 INJECTION INTRAMUSCULAR; INTRAVENOUS at 10:48

## 2021-08-10 RX ADMIN — MORPHINE SULFATE 4 MG: 2 INJECTION, SOLUTION INTRAMUSCULAR; INTRAVENOUS at 23:29

## 2021-08-10 RX ADMIN — SUFENTANIL CITRATE 25 MCG: 50 INJECTION, SOLUTION EPIDURAL; INTRAVENOUS at 10:15

## 2021-08-10 RX ADMIN — MIDAZOLAM 1 MG: 1 INJECTION INTRAMUSCULAR; INTRAVENOUS at 07:41

## 2021-08-10 RX ADMIN — ROCURONIUM BROMIDE 50 MG: 50 INJECTION INTRAVENOUS at 06:53

## 2021-08-10 RX ADMIN — AMINOCAPROIC ACID 10 G: 250 INJECTION, SOLUTION INTRAVENOUS at 07:36

## 2021-08-10 RX ADMIN — MORPHINE SULFATE 4 MG: 2 INJECTION, SOLUTION INTRAMUSCULAR; INTRAVENOUS at 16:01

## 2021-08-10 RX ADMIN — ALBUMIN HUMAN 500 ML: 0.05 INJECTION, SOLUTION INTRAVENOUS at 12:32

## 2021-08-10 RX ADMIN — SUFENTANIL CITRATE 25 MCG: 50 INJECTION, SOLUTION EPIDURAL; INTRAVENOUS at 09:56

## 2021-08-10 RX ADMIN — METOCLOPRAMIDE HYDROCHLORIDE 10 MG: 5 INJECTION INTRAMUSCULAR; INTRAVENOUS at 12:09

## 2021-08-10 RX ADMIN — PANTOPRAZOLE SODIUM 40 MG: 40 INJECTION, POWDER, FOR SOLUTION INTRAVENOUS at 12:10

## 2021-08-10 RX ADMIN — MIDAZOLAM 2 MG: 1 INJECTION INTRAMUSCULAR; INTRAVENOUS at 09:56

## 2021-08-10 RX ADMIN — MIDAZOLAM 2 MG: 1 INJECTION INTRAMUSCULAR; INTRAVENOUS at 06:43

## 2021-08-10 RX ADMIN — SODIUM CHLORIDE: 9 INJECTION, SOLUTION INTRAVENOUS at 06:40

## 2021-08-10 RX ADMIN — SODIUM CHLORIDE: 9 INJECTION, SOLUTION INTRAVENOUS at 07:13

## 2021-08-10 RX ADMIN — DEXMEDETOMIDINE HYDROCHLORIDE 0.8 MCG/KG/HR: 100 INJECTION, SOLUTION, CONCENTRATE INTRAVENOUS at 16:45

## 2021-08-10 RX ADMIN — ALBUMIN HUMAN 250 ML: 0.05 INJECTION, SOLUTION INTRAVENOUS at 14:40

## 2021-08-10 RX ADMIN — METOCLOPRAMIDE HYDROCHLORIDE 10 MG: 5 INJECTION INTRAMUSCULAR; INTRAVENOUS at 18:45

## 2021-08-10 RX ADMIN — NOREPINEPHRINE BITARTRATE 0.02 MCG/KG/MIN: 1 INJECTION, SOLUTION, CONCENTRATE INTRAVENOUS at 10:17

## 2021-08-10 RX ADMIN — PROPOFOL 50 MCG/KG/MIN: 10 INJECTION, EMULSION INTRAVENOUS at 20:05

## 2021-08-10 RX ADMIN — PROPOFOL 50 MCG/KG/MIN: 10 INJECTION, EMULSION INTRAVENOUS at 17:42

## 2021-08-10 RX ADMIN — DEXMEDETOMIDINE HYDROCHLORIDE 1.2 MCG/KG/HR: 100 INJECTION, SOLUTION, CONCENTRATE INTRAVENOUS at 20:46

## 2021-08-10 RX ADMIN — METOPROLOL TARTRATE 12.5 MG: 25 TABLET, FILM COATED ORAL at 05:17

## 2021-08-10 RX ADMIN — CEFAZOLIN SODIUM 2 G: 2 INJECTION, SOLUTION INTRAVENOUS at 10:22

## 2021-08-10 RX ADMIN — SUFENTANIL CITRATE 25 MCG: 50 INJECTION, SOLUTION EPIDURAL; INTRAVENOUS at 10:22

## 2021-08-10 RX ADMIN — MUPIROCIN 1 APPLICATION: 20 OINTMENT TOPICAL at 21:53

## 2021-08-10 RX ADMIN — MAGNESIUM SULFATE HEPTAHYDRATE 1 G: 1 INJECTION, SOLUTION INTRAVENOUS at 21:53

## 2021-08-10 RX ADMIN — CEFAZOLIN SODIUM 2 G: 2 INJECTION, SOLUTION INTRAVENOUS at 18:45

## 2021-08-10 RX ADMIN — ALBUMIN HUMAN 250 ML: 0.05 INJECTION, SOLUTION INTRAVENOUS at 16:49

## 2021-08-10 RX ADMIN — AMINOCAPROIC ACID 10 G: 250 INJECTION, SOLUTION INTRAVENOUS at 10:29

## 2021-08-10 RX ADMIN — Medication 50 MCG/KG/MIN: at 08:17

## 2021-08-10 NOTE — ANESTHESIA PROCEDURE NOTES
Airway  Urgency: elective    Date/Time: 8/10/2021 6:55 AM    General Information and Staff    Patient location during procedure: OR  Anesthesiologist: Curry Conley MD    Indications and Patient Condition    Preoxygenated: yes      Final Airway Details  Final airway type: endotracheal airway      Successful airway: ETT  Cuffed: yes   Successful intubation technique: direct laryngoscopy  Endotracheal tube insertion site: oral  Blade: Ford  Blade size: 4  ETT size (mm): 8.0  Cormack-Lehane Classification: grade I - full view of glottis  Placement verified by: chest auscultation   Number of attempts at approach: 1

## 2021-08-10 NOTE — ANESTHESIA PROCEDURE NOTES
Central Line      Patient reassessed immediately prior to procedure    Patient location during procedure: OR  Stop Time:8/10/2021 7:13 AM  Indications: vascular access, MD/Surgeon request and central pressure monitoring  Staff  Anesthesiologist: Curry Conley MD  Preanesthetic Checklist  Completed: patient identified, IV checked, risks and benefits discussed, surgical consent, monitors and equipment checked, pre-op evaluation and timeout performed  Central Line Prep  Sterile Tech:cap, gloves, gown, mask and sterile barriers  Prep: chloraprep  Patient monitoring: blood pressure monitoring, continuous pulse oximetry and EKG  Central Line Procedure  Laterality:right  Location:internal jugular  Catheter Type:Bruce-Freddy  Catheter Size:9 Fr  Guidance:landmark technique  Assessment  Post procedure:biopatch applied, line sutured and occlusive dressing applied  Assessement:blood return through all ports, free fluid flow and Juan Test  Complications:no  Patient Tolerance:patient tolerated the procedure well with no apparent complications

## 2021-08-10 NOTE — ANESTHESIA PROCEDURE NOTES
Preanesthesia Checklist:  Patient identified, IV assessed, risks and benefits discussed, monitors and equipment assessed, procedure being performed at surgeon's request and anesthesia consent obtained.    General Procedure Information  JABIER Placed for monitoring purposes only -- This is not a diagnostic JABIER          Anesthesia Information      Echocardiogram Comments:       JABIER placed easily x 1 attempt  DMP

## 2021-08-10 NOTE — ANESTHESIA PROCEDURE NOTES
Arterial Line      Patient reassessed immediately prior to procedure    Patient location during procedure: OR  Start time: 8/10/2021 6:44 AM  Stop Time:8/10/2021 6:47 AM       Line placed for hemodynamic monitoring.  Performed By   Anesthesiologist: Curry Conley MD  Preanesthetic Checklist  Completed: patient identified, IV checked, risks and benefits discussed, surgical consent, monitors and equipment checked, pre-op evaluation and timeout performed  Arterial Line Prep   Sterile Tech: gloves and cap  Prep: ChloraPrep  Patient monitoring: blood pressure monitoring, continuous pulse oximetry and EKG  Arterial Line Procedure   Laterality:left  Location:  radial artery  Catheter size: 20 G   Guidance: landmark technique  Number of attempts: 1  Successful placement: yes  Post Assessment   Dressing Type: occlusive dressing applied and wrist guard applied.   Complications no  Circ/Move/Sens Assessment: normal and unchanged.   Patient Tolerance: patient tolerated the procedure well with no apparent complications

## 2021-08-10 NOTE — ANESTHESIA PROCEDURE NOTES
Central Line      Patient reassessed immediately prior to procedure    Patient location during procedure: OR  Start time: 8/10/2021 7:03 AM  Stop Time:8/10/2021 7:13 AM  Indications: vascular access, MD/Surgeon request and central pressure monitoring  Staff  Anesthesiologist: Curry Conley MD  Preanesthetic Checklist  Completed: patient identified, IV checked, risks and benefits discussed, surgical consent, monitors and equipment checked, pre-op evaluation and timeout performed  Central Line Prep  Sterile Tech:cap, gloves, gown, mask and sterile barriers  Prep: chloraprep  Patient monitoring: blood pressure monitoring, continuous pulse oximetry and EKG  Central Line Procedure  Laterality:right  Location:internal jugular  Catheter Type:Cordis  Catheter Size:9 Fr  Guidance:landmark technique  Assessment  Post procedure:biopatch applied, line sutured and occlusive dressing applied  Assessement:blood return through all ports, free fluid flow and Juan Test  Complications:no  Patient Tolerance:patient tolerated the procedure well with no apparent complications

## 2021-08-10 NOTE — ANESTHESIA POSTPROCEDURE EVALUATION
Patient: Marcell Gunn    Procedure Summary     Date: 08/10/21 Room / Location: [facility] OR [address] [place] [address] MAIN OR    Anesthesia Start: 0637 Anesthesia Stop: 1151    Procedures:       MEDIAN STERNOTOMY, CORONARY ARTERY BYPASS GRAFTING X  8  UTILIZING LEFT INTERNAL MAMMARY ARTERY AND LEFT ENDOSCOPICALLY HARVESTED SAPHENOUS VEIN, PRP (N/A Chest)      TRANSESOPHAGEAL ECHOCARDIOGRAM WITH ANESTHESIA (N/A Chest) Diagnosis:       Coronary artery disease of native heart with stable angina pectoris, unspecified vessel or lesion type (CMS/HCC)      (Coronary artery disease of native heart with stable angina pectoris, unspecified vessel or lesion type (CMS/HCC) [I25.118])    Surgeons: Jr Robbin Patten MD Provider: Curry Conley MD    Anesthesia Type: general ASA Status: 4          Anesthesia Type: general    Vitals  Vitals Value Taken Time   /90 08/10/21 1201   Temp 36.4 °C (97.52 °F) 08/10/21 1206   Pulse 80 08/10/21 1206   Resp 18 08/10/21 1145   SpO2 99 % 08/10/21 1206   Vitals shown include unvalidated device data.        Post Anesthesia Care and Evaluation    Patient location during evaluation: ICU  Patient participation: waiting for patient participation  Pain management: adequate  Airway patency: patent    Cardiovascular status: acceptable  Respiratory status: acceptable, ETT, intubated and ventilator  Hydration status: acceptable

## 2021-08-11 ENCOUNTER — APPOINTMENT (OUTPATIENT)
Dept: GENERAL RADIOLOGY | Facility: HOSPITAL | Age: 56
End: 2021-08-11

## 2021-08-11 LAB
ALBUMIN SERPL-MCNC: 4.2 G/DL (ref 3.5–5.2)
ANION GAP SERPL CALCULATED.3IONS-SCNC: 11 MMOL/L (ref 5–15)
ARTERIAL PATENCY WRIST A: ABNORMAL
ATMOSPHERIC PRESS: 755.2 MMHG
BASE EXCESS BLDA CALC-SCNC: 0 MMOL/L (ref 0–2)
BASOPHILS # BLD AUTO: 0 10*3/MM3 (ref 0–0.2)
BASOPHILS NFR BLD AUTO: 0 % (ref 0–1.5)
BDY SITE: ABNORMAL
BUN SERPL-MCNC: 15 MG/DL (ref 6–20)
BUN/CREAT SERPL: 16.7 (ref 7–25)
CA-I BLD-MCNC: 5 MG/DL (ref 4.6–5.4)
CA-I SERPL ISE-MCNC: 1.26 MMOL/L (ref 1.15–1.35)
CALCIUM SPEC-SCNC: 8.4 MG/DL (ref 8.6–10.5)
CHLORIDE SERPL-SCNC: 107 MMOL/L (ref 98–107)
CO2 SERPL-SCNC: 24 MMOL/L (ref 22–29)
CREAT SERPL-MCNC: 0.9 MG/DL (ref 0.76–1.27)
DEPRECATED RDW RBC AUTO: 44 FL (ref 37–54)
EOSINOPHIL # BLD AUTO: 0.01 10*3/MM3 (ref 0–0.4)
EOSINOPHIL NFR BLD AUTO: 0.1 % (ref 0.3–6.2)
ERYTHROCYTE [DISTWIDTH] IN BLOOD BY AUTOMATED COUNT: 12.5 % (ref 12.3–15.4)
GFR SERPL CREATININE-BSD FRML MDRD: 88 ML/MIN/1.73
GLUCOSE BLDC GLUCOMTR-MCNC: 111 MG/DL (ref 70–130)
GLUCOSE BLDC GLUCOMTR-MCNC: 111 MG/DL (ref 70–130)
GLUCOSE BLDC GLUCOMTR-MCNC: 124 MG/DL (ref 70–130)
GLUCOSE BLDC GLUCOMTR-MCNC: 176 MG/DL (ref 70–130)
GLUCOSE BLDC GLUCOMTR-MCNC: 179 MG/DL (ref 70–130)
GLUCOSE BLDC GLUCOMTR-MCNC: 227 MG/DL (ref 70–130)
GLUCOSE BLDC GLUCOMTR-MCNC: 98 MG/DL (ref 70–130)
GLUCOSE SERPL-MCNC: 99 MG/DL (ref 65–99)
HCO3 BLDA-SCNC: 25 MMOL/L (ref 22–28)
HCT VFR BLD AUTO: 28.4 % (ref 37.5–51)
HGB BLD-MCNC: 9.8 G/DL (ref 13–17.7)
IMM GRANULOCYTES # BLD AUTO: 0.03 10*3/MM3 (ref 0–0.05)
IMM GRANULOCYTES NFR BLD AUTO: 0.4 % (ref 0–0.5)
INHALED O2 CONCENTRATION: 40 %
INR PPP: 1.31 (ref 0.9–1.1)
LYMPHOCYTES # BLD AUTO: 1.07 10*3/MM3 (ref 0.7–3.1)
LYMPHOCYTES NFR BLD AUTO: 12.6 % (ref 19.6–45.3)
MAGNESIUM SERPL-MCNC: 2.2 MG/DL (ref 1.6–2.6)
MCH RBC QN AUTO: 33.6 PG (ref 26.6–33)
MCHC RBC AUTO-ENTMCNC: 34.5 G/DL (ref 31.5–35.7)
MCV RBC AUTO: 97.3 FL (ref 79–97)
MODALITY: ABNORMAL
MONOCYTES # BLD AUTO: 0.59 10*3/MM3 (ref 0.1–0.9)
MONOCYTES NFR BLD AUTO: 7 % (ref 5–12)
NEUTROPHILS NFR BLD AUTO: 6.78 10*3/MM3 (ref 1.7–7)
NEUTROPHILS NFR BLD AUTO: 79.9 % (ref 42.7–76)
NRBC BLD AUTO-RTO: 0 /100 WBC (ref 0–0.2)
O2 A-A PPRESDIFF RESPIRATORY: 0.3 MMHG
PCO2 BLDA: 41.3 MM HG (ref 35–45)
PEEP RESPIRATORY: 7.5 CM[H2O]
PH BLDA: 7.39 PH UNITS (ref 7.35–7.45)
PHOSPHATE SERPL-MCNC: 4.2 MG/DL (ref 2.5–4.5)
PLATELET # BLD AUTO: 118 10*3/MM3 (ref 140–450)
PMV BLD AUTO: 10 FL (ref 6–12)
PO2 BLDA: 73.7 MM HG (ref 80–100)
POTASSIUM SERPL-SCNC: 3.8 MMOL/L (ref 3.5–5.2)
PROTHROMBIN TIME: 16.1 SECONDS (ref 11.7–14.2)
PSV: 5 CMH2O
QT INTERVAL: 370 MS
RBC # BLD AUTO: 2.92 10*6/MM3 (ref 4.14–5.8)
SAO2 % BLDCOA: 94.4 % (ref 92–99)
SET MECH RESP RATE: 24
SODIUM SERPL-SCNC: 142 MMOL/L (ref 136–145)
TOTAL RATE: 24 BREATHS/MINUTE
VENTILATOR MODE: ABNORMAL
VT ON VENT VENT: 512 ML
WBC # BLD AUTO: 8.48 10*3/MM3 (ref 3.4–10.8)

## 2021-08-11 PROCEDURE — 94003 VENT MGMT INPAT SUBQ DAY: CPT

## 2021-08-11 PROCEDURE — 85610 PROTHROMBIN TIME: CPT | Performed by: NURSE PRACTITIONER

## 2021-08-11 PROCEDURE — 94799 UNLISTED PULMONARY SVC/PX: CPT

## 2021-08-11 PROCEDURE — 25010000002 CALCIUM GLUCONATE-NACL 1-0.675 GM/50ML-% SOLUTION: Performed by: NURSE PRACTITIONER

## 2021-08-11 PROCEDURE — 71045 X-RAY EXAM CHEST 1 VIEW: CPT

## 2021-08-11 PROCEDURE — 93010 ELECTROCARDIOGRAM REPORT: CPT | Performed by: INTERNAL MEDICINE

## 2021-08-11 PROCEDURE — 25010000002 MAGNESIUM SULFATE IN D5W 1G/100ML (PREMIX) 1-5 GM/100ML-% SOLUTION: Performed by: NURSE PRACTITIONER

## 2021-08-11 PROCEDURE — 85025 COMPLETE CBC W/AUTO DIFF WBC: CPT | Performed by: NURSE PRACTITIONER

## 2021-08-11 PROCEDURE — 82962 GLUCOSE BLOOD TEST: CPT

## 2021-08-11 PROCEDURE — 63710000001 INSULIN LISPRO (HUMAN) PER 5 UNITS: Performed by: NURSE PRACTITIONER

## 2021-08-11 PROCEDURE — 25010000003 CEFAZOLIN IN DEXTROSE 2-4 GM/100ML-% SOLUTION: Performed by: NURSE PRACTITIONER

## 2021-08-11 PROCEDURE — 25010000002 FUROSEMIDE PER 20 MG: Performed by: NURSE PRACTITIONER

## 2021-08-11 PROCEDURE — 93005 ELECTROCARDIOGRAM TRACING: CPT | Performed by: THORACIC SURGERY (CARDIOTHORACIC VASCULAR SURGERY)

## 2021-08-11 PROCEDURE — 80069 RENAL FUNCTION PANEL: CPT | Performed by: NURSE PRACTITIONER

## 2021-08-11 PROCEDURE — 82803 BLOOD GASES ANY COMBINATION: CPT

## 2021-08-11 PROCEDURE — 25010000002 MIDAZOLAM PER 1 MG: Performed by: NURSE PRACTITIONER

## 2021-08-11 PROCEDURE — 97110 THERAPEUTIC EXERCISES: CPT

## 2021-08-11 PROCEDURE — 25010000002 PROPOFOL 10 MG/ML EMULSION: Performed by: NURSE PRACTITIONER

## 2021-08-11 PROCEDURE — 83735 ASSAY OF MAGNESIUM: CPT | Performed by: NURSE PRACTITIONER

## 2021-08-11 PROCEDURE — 25010000002 MORPHINE PER 10 MG: Performed by: NURSE PRACTITIONER

## 2021-08-11 PROCEDURE — 97162 PT EVAL MOD COMPLEX 30 MIN: CPT

## 2021-08-11 PROCEDURE — 25010000002 ENOXAPARIN PER 10 MG: Performed by: NURSE PRACTITIONER

## 2021-08-11 PROCEDURE — 25010000003 POTASSIUM CHLORIDE PER 2 MEQ: Performed by: NURSE PRACTITIONER

## 2021-08-11 PROCEDURE — 82330 ASSAY OF CALCIUM: CPT | Performed by: NURSE PRACTITIONER

## 2021-08-11 PROCEDURE — 25010000002 METOCLOPRAMIDE PER 10 MG: Performed by: NURSE PRACTITIONER

## 2021-08-11 RX ORDER — AMIODARONE HYDROCHLORIDE 200 MG/1
300 TABLET ORAL EVERY 12 HOURS SCHEDULED
Status: DISCONTINUED | OUTPATIENT
Start: 2021-08-11 | End: 2021-08-14

## 2021-08-11 RX ORDER — NICOTINE POLACRILEX 4 MG
15 LOZENGE BUCCAL
Status: DISCONTINUED | OUTPATIENT
Start: 2021-08-11 | End: 2021-08-15 | Stop reason: HOSPADM

## 2021-08-11 RX ORDER — SODIUM CHLORIDE 0.9 % (FLUSH) 0.9 %
10 SYRINGE (ML) INJECTION AS NEEDED
Status: DISCONTINUED | OUTPATIENT
Start: 2021-08-11 | End: 2021-08-15 | Stop reason: HOSPADM

## 2021-08-11 RX ORDER — FUROSEMIDE 10 MG/ML
40 INJECTION INTRAMUSCULAR; INTRAVENOUS ONCE
Status: COMPLETED | OUTPATIENT
Start: 2021-08-11 | End: 2021-08-11

## 2021-08-11 RX ORDER — DEXTROSE MONOHYDRATE 25 G/50ML
25 INJECTION, SOLUTION INTRAVENOUS
Status: DISCONTINUED | OUTPATIENT
Start: 2021-08-11 | End: 2021-08-15 | Stop reason: HOSPADM

## 2021-08-11 RX ORDER — LORAZEPAM 2 MG/ML
1 INJECTION INTRAMUSCULAR ONCE
Status: DISCONTINUED | OUTPATIENT
Start: 2021-08-11 | End: 2021-08-12

## 2021-08-11 RX ORDER — METHADONE HYDROCHLORIDE 10 MG/1
20 TABLET ORAL ONCE
Status: DISCONTINUED | OUTPATIENT
Start: 2021-08-11 | End: 2021-08-12

## 2021-08-11 RX ORDER — GABAPENTIN 100 MG/1
200 CAPSULE ORAL EVERY 12 HOURS SCHEDULED
Status: DISCONTINUED | OUTPATIENT
Start: 2021-08-11 | End: 2021-08-15 | Stop reason: HOSPADM

## 2021-08-11 RX ORDER — CALCIUM GLUCONATE 20 MG/ML
2 INJECTION, SOLUTION INTRAVENOUS ONCE
Status: COMPLETED | OUTPATIENT
Start: 2021-08-11 | End: 2021-08-11

## 2021-08-11 RX ORDER — FAMOTIDINE 10 MG/ML
20 INJECTION, SOLUTION INTRAVENOUS ONCE
Status: DISCONTINUED | OUTPATIENT
Start: 2021-08-11 | End: 2021-08-12

## 2021-08-11 RX ORDER — ACETAMINOPHEN 500 MG
1000 TABLET ORAL ONCE
Status: DISCONTINUED | OUTPATIENT
Start: 2021-08-11 | End: 2021-08-12

## 2021-08-11 RX ORDER — INSULIN LISPRO 100 [IU]/ML
0-9 INJECTION, SOLUTION INTRAVENOUS; SUBCUTANEOUS
Status: DISCONTINUED | OUTPATIENT
Start: 2021-08-11 | End: 2021-08-15 | Stop reason: HOSPADM

## 2021-08-11 RX ORDER — SODIUM CHLORIDE 0.9 % (FLUSH) 0.9 %
10 SYRINGE (ML) INJECTION EVERY 12 HOURS SCHEDULED
Status: DISCONTINUED | OUTPATIENT
Start: 2021-08-11 | End: 2021-08-15 | Stop reason: HOSPADM

## 2021-08-11 RX ORDER — GUAIFENESIN 600 MG/1
1200 TABLET, EXTENDED RELEASE ORAL EVERY 12 HOURS SCHEDULED
Status: DISCONTINUED | OUTPATIENT
Start: 2021-08-11 | End: 2021-08-15 | Stop reason: HOSPADM

## 2021-08-11 RX ORDER — POTASSIUM CHLORIDE 750 MG/1
20 TABLET, FILM COATED, EXTENDED RELEASE ORAL ONCE
Status: COMPLETED | OUTPATIENT
Start: 2021-08-11 | End: 2021-08-11

## 2021-08-11 RX ADMIN — HYDROCODONE BITARTRATE AND ACETAMINOPHEN 2 TABLET: 5; 325 TABLET ORAL at 21:10

## 2021-08-11 RX ADMIN — MORPHINE SULFATE 4 MG: 2 INJECTION, SOLUTION INTRAMUSCULAR; INTRAVENOUS at 00:19

## 2021-08-11 RX ADMIN — CYCLOBENZAPRINE 10 MG: 10 TABLET, FILM COATED ORAL at 21:10

## 2021-08-11 RX ADMIN — GABAPENTIN 200 MG: 100 CAPSULE ORAL at 08:38

## 2021-08-11 RX ADMIN — SODIUM CHLORIDE, PRESERVATIVE FREE 10 ML: 5 INJECTION INTRAVENOUS at 17:09

## 2021-08-11 RX ADMIN — PROPOFOL 40 MCG/KG/MIN: 10 INJECTION, EMULSION INTRAVENOUS at 01:15

## 2021-08-11 RX ADMIN — MAGNESIUM SULFATE HEPTAHYDRATE 1 G: 1 INJECTION, SOLUTION INTRAVENOUS at 05:00

## 2021-08-11 RX ADMIN — PANTOPRAZOLE SODIUM 40 MG: 40 TABLET, DELAYED RELEASE ORAL at 06:04

## 2021-08-11 RX ADMIN — AMIODARONE HYDROCHLORIDE 300 MG: 200 TABLET ORAL at 08:39

## 2021-08-11 RX ADMIN — GABAPENTIN 200 MG: 100 CAPSULE ORAL at 21:11

## 2021-08-11 RX ADMIN — DEXMEDETOMIDINE HYDROCHLORIDE 1 MCG/KG/HR: 100 INJECTION, SOLUTION, CONCENTRATE INTRAVENOUS at 01:16

## 2021-08-11 RX ADMIN — INSULIN LISPRO 2 UNITS: 100 INJECTION, SOLUTION INTRAVENOUS; SUBCUTANEOUS at 17:07

## 2021-08-11 RX ADMIN — METOCLOPRAMIDE HYDROCHLORIDE 10 MG: 5 INJECTION INTRAMUSCULAR; INTRAVENOUS at 06:05

## 2021-08-11 RX ADMIN — CHLORHEXIDINE GLUCONATE 15 ML: 1.2 RINSE ORAL at 21:11

## 2021-08-11 RX ADMIN — POTASSIUM CHLORIDE 20 MEQ: 750 TABLET, EXTENDED RELEASE ORAL at 08:38

## 2021-08-11 RX ADMIN — ENOXAPARIN SODIUM 40 MG: 40 INJECTION SUBCUTANEOUS at 17:07

## 2021-08-11 RX ADMIN — ASPIRIN 81 MG: 81 TABLET, COATED ORAL at 08:39

## 2021-08-11 RX ADMIN — HYDROCODONE BITARTRATE AND ACETAMINOPHEN 2 TABLET: 5; 325 TABLET ORAL at 17:09

## 2021-08-11 RX ADMIN — AMIODARONE HYDROCHLORIDE 300 MG: 200 TABLET ORAL at 21:11

## 2021-08-11 RX ADMIN — MUPIROCIN 1 APPLICATION: 20 OINTMENT TOPICAL at 21:11

## 2021-08-11 RX ADMIN — POTASSIUM CHLORIDE 20 MEQ: 29.8 INJECTION, SOLUTION INTRAVENOUS at 04:49

## 2021-08-11 RX ADMIN — CEFAZOLIN SODIUM 2 G: 2 INJECTION, SOLUTION INTRAVENOUS at 17:07

## 2021-08-11 RX ADMIN — FUROSEMIDE 40 MG: 10 INJECTION, SOLUTION INTRAMUSCULAR; INTRAVENOUS at 08:37

## 2021-08-11 RX ADMIN — HYDROCODONE BITARTRATE AND ACETAMINOPHEN 2 TABLET: 5; 325 TABLET ORAL at 06:04

## 2021-08-11 RX ADMIN — ATORVASTATIN CALCIUM 40 MG: 20 TABLET, FILM COATED ORAL at 21:11

## 2021-08-11 RX ADMIN — CALCIUM GLUCONATE 2 G: 20 INJECTION, SOLUTION INTRAVENOUS at 08:37

## 2021-08-11 RX ADMIN — INSULIN LISPRO 4 UNITS: 100 INJECTION, SOLUTION INTRAVENOUS; SUBCUTANEOUS at 11:35

## 2021-08-11 RX ADMIN — CEFAZOLIN SODIUM 2 G: 2 INJECTION, SOLUTION INTRAVENOUS at 02:58

## 2021-08-11 RX ADMIN — MIDAZOLAM 2 MG: 1 INJECTION INTRAMUSCULAR; INTRAVENOUS at 03:40

## 2021-08-11 RX ADMIN — METOPROLOL TARTRATE 25 MG: 25 TABLET, FILM COATED ORAL at 21:11

## 2021-08-11 RX ADMIN — GUAIFENESIN 1200 MG: 600 TABLET, EXTENDED RELEASE ORAL at 08:38

## 2021-08-11 RX ADMIN — GUAIFENESIN 1200 MG: 600 TABLET, EXTENDED RELEASE ORAL at 21:11

## 2021-08-11 RX ADMIN — CHLORHEXIDINE GLUCONATE 15 ML: 1.2 RINSE ORAL at 08:38

## 2021-08-11 RX ADMIN — SODIUM CHLORIDE, PRESERVATIVE FREE 10 ML: 5 INJECTION INTRAVENOUS at 21:12

## 2021-08-11 RX ADMIN — MUPIROCIN 1 APPLICATION: 20 OINTMENT TOPICAL at 08:39

## 2021-08-11 RX ADMIN — INSULIN LISPRO 2 UNITS: 100 INJECTION, SOLUTION INTRAVENOUS; SUBCUTANEOUS at 21:11

## 2021-08-11 RX ADMIN — HYDROCODONE BITARTRATE AND ACETAMINOPHEN 2 TABLET: 5; 325 TABLET ORAL at 10:07

## 2021-08-11 RX ADMIN — CEFAZOLIN SODIUM 2 G: 2 INJECTION, SOLUTION INTRAVENOUS at 10:08

## 2021-08-11 RX ADMIN — DOCUSATE SODIUM 50MG AND SENNOSIDES 8.6MG 2 TABLET: 8.6; 5 TABLET, FILM COATED ORAL at 21:11

## 2021-08-11 NOTE — PROGRESS NOTES
LOS: 2 days   Patient Care Team:  Provider, No Known as PCP - General    Chief Complaint: post op  Subjective      Vital Signs  Temp:  [97.2 °F (36.2 °C)-101.8 °F (38.8 °C)] 101.8 °F (38.8 °C)  Heart Rate:  [80-98] 97  Resp:  [14-22] 22  BP: ()/(33-89) 98/61  Arterial Line BP: ()/() 109/64  FiO2 (%):  [40 %-100 %] 40 %  Body mass index is 37.56 kg/m².    Intake/Output Summary (Last 24 hours) at 8/11/2021 0729  Last data filed at 8/11/2021 0700  Gross per 24 hour   Intake 5846 ml   Output 6940 ml   Net -1094 ml     No intake/output data recorded.    Chest tube drainage last 8 hours 160/140        08/09/21  1930   Weight: 109 kg (239 lb 12.8 oz)         Objective    Results Review:        WBC WBC   Date Value Ref Range Status   08/11/2021 8.48 3.40 - 10.80 10*3/mm3 Final   08/10/2021 9.20 3.40 - 10.80 10*3/mm3 Final   08/10/2021 15.90 (H) 3.40 - 10.80 10*3/mm3 Final   08/09/2021 7.46 3.40 - 10.80 10*3/mm3 Final      HGB Hemoglobin   Date Value Ref Range Status   08/11/2021 9.8 (L) 13.0 - 17.7 g/dL Final   08/10/2021 10.0 (L) 13.0 - 17.7 g/dL Final   08/10/2021 11.9 (L) 13.0 - 17.7 g/dL Final   08/09/2021 13.7 13.0 - 17.7 g/dL Final      HCT Hematocrit   Date Value Ref Range Status   08/11/2021 28.4 (L) 37.5 - 51.0 % Final   08/10/2021 28.6 (L) 37.5 - 51.0 % Final   08/10/2021 34.9 (L) 37.5 - 51.0 % Final   08/09/2021 39.2 37.5 - 51.0 % Final      Platelets Platelets   Date Value Ref Range Status   08/11/2021 118 (L) 140 - 450 10*3/mm3 Final   08/10/2021 130 (L) 140 - 450 10*3/mm3 Final   08/10/2021 162 140 - 450 10*3/mm3 Final   08/09/2021 201 140 - 450 10*3/mm3 Final        PT/INR:    Protime   Date Value Ref Range Status   08/11/2021 16.1 (H) 11.7 - 14.2 Seconds Final   08/10/2021 16.1 (H) 11.7 - 14.2 Seconds Final   08/09/2021 13.5 11.7 - 14.2 Seconds Final   /  INR   Date Value Ref Range Status   08/11/2021 1.31 (H) 0.90 - 1.10 Final   08/10/2021 1.31 (H) 0.90 - 1.10 Final   08/09/2021 1.05 0.90  - 1.10 Final       Sodium Sodium   Date Value Ref Range Status   08/11/2021 142 136 - 145 mmol/L Final   08/10/2021 140 136 - 145 mmol/L Final   08/10/2021 138 136 - 145 mmol/L Final   08/10/2021 138 136 - 145 mmol/L Final   08/09/2021 138 136 - 145 mmol/L Final      Potassium Potassium   Date Value Ref Range Status   08/11/2021 3.8 3.5 - 5.2 mmol/L Final   08/10/2021 4.1 3.5 - 5.2 mmol/L Final   08/10/2021 4.0 3.5 - 5.2 mmol/L Final     Comment:     Slight hemolysis detected by analyzer. Results may be affected.   08/10/2021 3.8 3.5 - 5.2 mmol/L Final     Comment:     Slight hemolysis detected by analyzer. Results may be affected.   08/09/2021 4.3 3.5 - 5.2 mmol/L Final      Chloride Chloride   Date Value Ref Range Status   08/11/2021 107 98 - 107 mmol/L Final   08/10/2021 107 98 - 107 mmol/L Final   08/10/2021 104 98 - 107 mmol/L Final   08/10/2021 100 98 - 107 mmol/L Final   08/09/2021 100 98 - 107 mmol/L Final      Bicarbonate CO2   Date Value Ref Range Status   08/11/2021 24.0 22.0 - 29.0 mmol/L Final   08/10/2021 22.3 22.0 - 29.0 mmol/L Final   08/10/2021 24.7 22.0 - 29.0 mmol/L Final   08/10/2021 24.1 22.0 - 29.0 mmol/L Final   08/09/2021 25.4 22.0 - 29.0 mmol/L Final      BUN BUN   Date Value Ref Range Status   08/11/2021 15 6 - 20 mg/dL Final   08/10/2021 15 6 - 20 mg/dL Final   08/10/2021 16 6 - 20 mg/dL Final   08/10/2021 15 6 - 20 mg/dL Final   08/09/2021 12 6 - 20 mg/dL Final      Creatinine Creatinine   Date Value Ref Range Status   08/11/2021 0.90 0.76 - 1.27 mg/dL Final   08/10/2021 1.06 0.76 - 1.27 mg/dL Final   08/10/2021 1.19 0.76 - 1.27 mg/dL Final   08/10/2021 0.78 0.76 - 1.27 mg/dL Final   08/09/2021 0.81 0.76 - 1.27 mg/dL Final      Calcium Calcium   Date Value Ref Range Status   08/11/2021 8.4 (L) 8.6 - 10.5 mg/dL Final   08/10/2021 7.9 (L) 8.6 - 10.5 mg/dL Final   08/10/2021 8.3 (L) 8.6 - 10.5 mg/dL Final   08/10/2021 8.5 (L) 8.6 - 10.5 mg/dL Final   08/09/2021 9.0 8.6 - 10.5 mg/dL Final       Magnesium Magnesium   Date Value Ref Range Status   08/11/2021 2.2 1.6 - 2.6 mg/dL Final   08/10/2021 2.5 1.6 - 2.6 mg/dL Final   08/10/2021 2.5 1.6 - 2.6 mg/dL Final   08/09/2021 2.0 1.6 - 2.6 mg/dL Final          aspirin, 81 mg, Oral, Daily  atorvastatin, 40 mg, Oral, Nightly  ceFAZolin, 2 g, Intravenous, Q8H  chlorhexidine, 15 mL, Mouth/Throat, Q12H  enoxaparin, 40 mg, Subcutaneous, Daily  metoclopramide, 10 mg, Intravenous, Q6H  metoprolol tartrate, 12.5 mg, Oral, Q12H  mupirocin, , Each Nare, BID  pantoprazole, 40 mg, Oral, QAM  senna-docusate sodium, 2 tablet, Oral, Nightly      amiodarone, 0.5 mg/min, Last Rate: Stopped (08/11/21 0333)  clevidipine, 2-32 mg/hr  dexmedetomidine, 0.2-1.5 mcg/kg/hr, Last Rate: Stopped (08/11/21 0400)  DOPamine, 2-20 mcg/kg/min  EPINEPHrine, 0.02-0.1 mcg/kg/min  insulin, 0-50 Units/hr, Last Rate: Stopped (08/11/21 0430)  milrinone, 0.25-0.375 mcg/kg/min  niCARdipine, 5-15 mg/hr, Last Rate: Stopped (08/10/21 1636)  nitroglycerin, 5-200 mcg/min  norepinephrine, 0.02-0.3 mcg/kg/min, Last Rate: 0.01 mcg/kg/min (08/10/21 1230)  phenylephrine, 0.2-2 mcg/kg/min  propofol, 5-50 mcg/kg/min, Last Rate: Stopped (08/11/21 0400)  sodium chloride, 30 mL/hr, Last Rate: 30 mL/hr (08/10/21 1205)  sodium chloride, 30 mL/hr, Last Rate: 30 mL/hr (08/10/21 1205)            Patient Active Problem List   Diagnosis Code   • Coronary artery disease I25.10   • Chest pain R07.9   • CAD (coronary artery disease) I25.10       Assessment & Plan    -severe multivessel CAD-- s/p CABGx8 LIMA/LSVG- POD#1 [place]  -hypertension  -hyperlipidemia  -expected blood loss anemia    Looks good this morning  Up in the chair   5L NC-- wean as able  His left eye is swollen-- he denies any pain-- will continue to monitor and will order some artificial tears  IV diurese  Encourage pulmonary toilet-- add mucinex  Continue routine care  Transfer to stepdown    EFREN Vera  08/11/21  07:29 EDT

## 2021-08-11 NOTE — CASE MANAGEMENT/SOCIAL WORK
Discharge Planning Assessment  [facility]     Patient Name: Marecll Gunn  MRN: [id number]  Today's Date: 8/11/2021    Admit Date: 8/9/2021    Discharge Needs Assessment     Row Name 08/11/21 1640       Living Environment    Lives With  spouse;child(tabitha), adult    Name(s) of Who Lives With Patient  Wife, Eda Gunn and adult son Mikael    Current Living Arrangements  home/apartment/condo    Primary Care Provided by  self    Provides Primary Care For  no one    Family Caregiver if Needed  child(tabitha), adult;spouse    Family Caregiver Names  Eda, wife and son Mikael    Quality of Family Relationships  helpful;involved;supportive    Able to Return to Prior Arrangements  yes       Resource/Environmental Concerns    Resource/Environmental Concerns  none    Transportation Concerns  car, none       Transition Planning    Patient/Family Anticipates Transition to  home with family    Patient/Family Anticipated Services at Transition  home health care    Transportation Anticipated  family or friend will provide       Discharge Needs Assessment    Readmission Within the Last 30 Days  no previous admission in last 30 days    Equipment Currently Used at Home  none    Concerns to be Addressed  discharge planning    Anticipated Changes Related to Illness  none    Equipment Needed After Discharge  none    Discharge Facility/Level of Care Needs  home with home health    Provided Post Acute Provider Quality & Resource List?  Yes    Post Acute Provider Quality and Resource List  Home Health    Delivered To  Support Person    Support Person  ivania Garcia    Method of Delivery  In person    Patient's Choice of Community Agency(s)  [facility].  [facility] called  consult to Paulina [phone].        Discharge Plan     Row Name 08/11/21 6748       Plan    Plan  Home with [facility]; Contact Paulina [phone]/[facility] at discharge.    Plan Comments  CCP spoke with Pt and spouse, Eda Gunn  ([phone]) at bedside.  CCP introduced self and role.  Pt confirmed information on face sheet.  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  Pt reports he lives with wife and adult son Mikael in a two story home with a total of 15 entrance stair steps.  Pt reports he will be seeing a [facility] PCP at [facility] (Alan Prince [phone]) when he discharges.  CCP added Dr. Prince to Pt face sheet.  Pt confirmed pharmacy is [facility] in [place].  Pt denies issues with affording his medications.  Pt denies use of past home health, sub-acute rehab or DME.  Pt plans to return home at discharge with assistance of his wife and son. [facility] accepted.  [facility] is [facility] so therefore the home health referral was called to Paulina/[phone].  CCP will continue to follow…….ELMA RN/CCP        Continued Care and Services - Admitted Since 8/9/2021     Home Medical Care     Service Provider Request Status Selected Services Address Phone Fax Patient Preferred    [facility]  Accepted N/A [address], [address], [place] [address] [phone] --    [facility]  Pending - No Request [facility] N[facility]A [address] [address] [phone] --              Expected Discharge Date and Time     Expected Discharge Date Expected Discharge Time    Aug 17, 2021         Demographic Summary     Row Name 08/11/21 1638       General Information    Admission Type  inpatient    Arrived From  PACU/recovery room    Referral Source  admission list;physician    Reason for Consult  discharge planning    Preferred Language  English     Used During This Interaction  no       Contact Information    Permission Granted to Share Info With  family/designee        Functional Status     Row Name 08/11/21 1638       Functional Status    Usual Activity Tolerance   good    Current Activity Tolerance  moderate       Functional Status, IADL    Medications  independent    Meal Preparation  independent    Housekeeping  independent    Laundry  independent    Shopping  independent       Mental Status    General Appearance WDL  WDL       Mental Status Summary    Recent Changes in Mental Status/Cognitive Functioning  no changes       Employment/    Employment Status  employed full-time        Psychosocial    No documentation.       Abuse/Neglect    No documentation.       Legal    No documentation.       Substance Abuse    No documentation.       Patient Forms    No documentation.           Ashley Laguna RN

## 2021-08-11 NOTE — THERAPY EVALUATION
Patient Name: Marcell Gunn  : 1965    MRN: [id number]                              Today's Date: 2021       Admit Date: 2021    Visit Dx:     ICD-10-CM ICD-9-CM   1. S/P CABG (coronary artery bypass graft)  Z95.1 V45.81     Patient Active Problem List   Diagnosis   • Coronary artery disease   • Chest pain   • CAD (coronary artery disease)     Past Medical History:   Diagnosis Date   • Coronary artery disease    • Hyperlipidemia    • Hypertension      Past Surgical History:   Procedure Laterality Date   • CARDIAC CATHETERIZATION Left 2021    Procedure: Left Cardiac Catheterization;  Surgeon: Calderon Estes MD;  Location: [facility] INVASIVE LOCATION;  Service: Cardiovascular;  Laterality: Left;     General Information     Row Name 21          Physical Therapy Time and Intention    Document Type  evaluation  -MD miller) CLARA perez) MD muller)     Mode of Treatment  individual therapy;physical therapy  -MD CLARA perez (r)) MD muller)     Row Name 21          General Information    Patient Profile Reviewed  yes  -MD CLARA perez (r)) MD muller)     Existing Precautions/Restrictions  fall;cardiac;sternal  -MD CLARA perez (r)) MD muller)     Row Name 21          Living Environment    Lives With  spouse  -MD CLARA perez (r)) MD muller)     Row Name 21          Home Main Entrance    Number of Stairs, Main Entrance  six  -MD CLARA muller (r t))     Stair Railings, Main Entrance  railing on right side (ascending)  -MD CLARA muller (r t))     Row Name 21          Cognition    Orientation Status (Cognition)  oriented x 4  -MD CLARA muller (r t))     Row Name 21          Safety Issues, Functional Mobility    Impairments Affecting Function (Mobility)  balance;coordination;endurance/activity tolerance;shortness of breath;strength  -MD CLARA perez (r)) MD muller)       User Key  (r) = Recorded By, (t) = Taken By, (c) = Cosigned By    Initials Name Provider Type    Sonia Hill, PT Physical Therapist     Desmond Kimbrough, PT Student PT Student        Mobility     Row Name 08/11/21 0859          Bed Mobility    Comment (Bed Mobility)  Started in the chair  -MD (r) ME (t) MD (c)     Row Name 08/11/21 0859          Sit-Stand Transfer    Sit-Stand [place] (Transfers)  minimum assist (75% patient effort);2 person assist;verbal cues  -MD (r) ME (t) MD (c)     Assistive Device (Sit-Stand Transfers)  -- HHA  -MD (r) ME (t) MD (c)     Row Name 08/11/21 0859          Gait/Stairs (Locomotion)    [place] Level (Gait)  contact guard;minimum assist (75% patient effort);2 person assist  -MD (r) ME (t) MD (c)     Assistive Device (Gait)  -- HHA  -MD (r) ME (t) MD (c)     Distance in Feet (Gait)  60' with 1 rest break at 30'  -MD (r) ME (t) MD (c)     Deviations/Abnormal Patterns (Gait)  ramiro decreased;gait speed decreased;stride length decreased;festinating/shuffling  -MD (r) ME (t) MD (c)     Bilateral Gait Deviations  forward flexed posture Walking mostly on heels  -MD (r) ME (t) MD (c)     Comment (Gait/Stairs)  Pt required cues to relax his shoulders and to take longer steps.  -MD (r) ME (t) MD (c)       User Key  (r) = Recorded By, (t) = Taken By, (c) = Cosigned By    Initials Name Provider Type    Sonia Hill, PT Physical Therapist    Desmond Kimbrough, PT Student PT Student        Obj/Interventions     Row Name 08/11/21 0904          Range of Motion Comprehensive    General Range of Motion  bilateral lower extremity ROM WFL  -MD (r) ME (t) MD (c)     Row Name 08/11/21 0904          Strength Comprehensive (MMT)    General Manual Muscle Testing (MMT) Assessment  lower extremity strength deficits identified  -MD (r) ME (t) MD (c)     Comment, General Manual Muscle Testing (MMT) Assessment  Pt grossly >3/5 B  -MD (r) ME (t) MD (c)     Row Name 08/11/21 0904          Balance    Balance Assessment  sitting static balance;sitting dynamic balance;standing static balance;standing dynamic balance  -MD (r) ME (t) MD  (c)     Static Sitting Balance  WFL  -MD (r) ME (t) MD (c)     Dynamic Sitting Balance  WFL  -MD (r) ME (t) MD (c)     Static Standing Balance  WFL  -MD (r) ME (t) MD (c)     Dynamic Standing Balance  mild impairment  -MD (r) ME (t) MD (c)       User Key  (r) = Recorded By, (t) = Taken By, (c) = Cosigned By    Initials Name Provider Type    Sonai Hill, PT Physical Therapist    ME Desmond Barahona, PT Student PT Student        Goals/Plan     Row Name 08/11/21 0914          Patient Education Goal (PT)    Activity (Patient Education Goal, PT)  Pt will achieve Cardiac level 5.  -MD (ivan) ME (t) MD (maicol)     Time Frame (Patient Education Goal, PT)  1 week  -MD miller) ME (max) MD (maicol)       User Key  (r) = Recorded By, (t) = Taken By, (c) = Cosigned By    Initials Name Provider Type    Sonia Hill, PT Physical Therapist    Desmond Kimbrough, PT Student PT Student        Clinical Impression     Row Name 08/11/21 0905          Pain    Additional Documentation  Pain Scale: FACES Pre/Post-Treatment (Group)  -MD CLARA (r) (t) MD (c)     Row Name 08/11/21 0905          Pain Scale: FACES Pre/Post-Treatment    Pain: FACES Scale, Pretreatment  2-->hurts little bit  -MD miller) ME (max) MD (c)     Posttreatment Pain Rating  2-->hurts little bit  -MD miller) ME (t) MD (c)     Pain Location  chest  -MD CLARA (r) (max) MD (c)     Row Name 08/11/21 0905          Plan of Care Review    Plan of Care Reviewed With  patient  -MD CLARA perez (r)) MD (maicol)     Outcome Summary  Pt is a 55 y.o. male that presents to the hospital with CAD and underwent a CABG x8. Pt lives at home with spouse and has 6 CARLA house. Pt also is A&O x4 and agreeable to ambulating with PT. Upon PT evaluation, pt required Min-A x2 for sit-stand using HHA and CGA/Min-A x2 for ambulation of 60' using HHA and a rest break at 30'. Pt would benefit from skilled PT and is recommended to go home with assist and HH upon discharge  -MD (r) ME (t) MD (c)     Marine Name 08/11/21 0924          Therapy  Assessment/Plan (PT)    Rehab Potential (PT)  good, to achieve stated therapy goals  -MD (r) ME (t) MD (c)     Criteria for Skilled Interventions Met (PT)  yes;skilled treatment is necessary  -MD (r) ME (t) MD (c)     Row Name 08/11/21 0905          Vital Signs    Pre Systolic BP Rehab  104  -MD (r) ME (t) MD (c)     Pre Treatment Diastolic BP  70  -MD (r) ME (t) MD (c)     Post Systolic BP Rehab  129  -MD (r) ME (t) MD (c)     Post Treatment Diastolic BP  77  -MD (r) ME (t) MD (c)     Pretreatment Heart Rate (beats/min)  96  -MD (r) ME (t) MD (c)     Posttreatment Heart Rate (beats/min)  103  -MD (r) ME (t) MD (c)     Pre SpO2 (%)  93  -MD (r) ME (t) MD (c)     O2 Delivery Pre Treatment  nasal cannula 5L  -MD (r) ME (t) MD (c)     Post SpO2 (%)  93  -MD (r) ME (t) MD (c)     O2 Delivery Post Treatment  nasal cannula 5L  -MD (r) ME (t) MD (c)     Row Name 08/11/21 0905          Positioning and Restraints    Pre-Treatment Position  sitting in chair/recliner  -MD (r) ME (t) MD (c)     Post Treatment Position  chair  -MD (r) ME (t) MD (c)     In Chair  call light within reach;encouraged to call for assist;exit alarm on;sitting;notified nsg  -MD (r) ME (t) MD (c)       User Key  (r) = Recorded By, (t) = Taken By, (c) = Cosigned By    Initials Name Provider Type    Sonia Hill, PT Physical Therapist    ME Desmond Barahona, PT Student PT Student        Outcome Measures     Row Name 08/11/21 0914          How much help from another person do you currently need...    Turning from your back to your side while in flat bed without using bedrails?  3  -MD (r) ME (t) MD (c)     Moving from lying on back to sitting on the side of a flat bed without bedrails?  3  -MD (r) ME (t) MD (c)     Moving to and from a bed to a chair (including a wheelchair)?  3  -MD miller) ME (max) MD (maicol)     Standing up from a chair using your arms (e.g., wheelchair, bedside chair)?  3  -MD miller) ME (max) MD (maicol)     Climbing 3-5 steps with a railing?  2  -MD miller)  CLARA perez) MD muller)     To walk in hospital room?  3  -MD (r) ME (max) MD muller)     AM-PAC 6 Clicks Score (PT)  17  -MD miller) ME (t)     Row Name 08/11/21 0914          Functional Assessment    Outcome Measure Options  AM-PAC 6 Clicks Basic Mobility (PT)  -MD miller) ME (max) MD (maicol)       User Key  (r) = Recorded By, (t) = Taken By, (c) = Cosigned By    Initials Name Provider Type    Sonia Hill, PT Physical Therapist    Desmond Kimbrough, PT Student PT Student                       Physical Therapy Education                 Title: PT OT SLP Therapies (In Progress)     Topic: Physical Therapy (In Progress)     Point: Mobility training (Not Started)     Learner Progress:  Not documented in this visit.          Point: Home exercise program (Not Started)     Learner Progress:  Not documented in this visit.          Point: Body mechanics (Not Started)     Learner Progress:  Not documented in this visit.          Point: Precautions (Done)     Learning Progress Summary           Patient Acceptance, E, VU by ME at 8/11/2021 0915                               User Key     Initials Effective Dates Name Provider Type Discipline    ME 07/01/21 -  Desmond Barahona, PT Student PT Student PT              PT Recommendation and Plan     Plan of Care Reviewed With: patient  Outcome Summary: Pt is a 55 y.o. male that presents to the hospital with CAD and underwent a CABG x8. Pt lives at home with spouse and has 6 CARLA house. Pt also is A&O x4 and agreeable to ambulating with PT. Upon PT evaluation, pt required Min-A x2 for sit-stand using HHA and CGA/Min-A x2 for ambulation of 60' using HHA and a rest break at 30'. Pt would benefit from skilled PT and is recommended to go home with assist and HH upon discharge     Time Calculation:   PT Charges     Row Name 08/11/21 0858             Time Calculation    Start Time  0816  -MD miller) CLARA perez) MD (maicol)      Stop Time  0830  -MD miller) CLARA perez) MD muller)      Time Calculation (min)  14 min  -MD CLARA perez (r))      PT  Received On  08/11/21  -MD miller) CLARA HAYWARD (t) (maicol)      PT - Next Appointment  08/12/21  -MD miller) CLARA perez) MD muller)      PT Goal Re-Cert Due Date  08/18/21  -MD miller) ME (max) MD (maicol)        User Key  (r) = Recorded By, (t) = Taken By, (c) = Cosigned By    Initials Name Provider Type    Sonia Hill, PT Physical Therapist    Desmond Kimbrough, PT Student PT Student        Therapy Charges for Today     Code Description Service Date Service Provider Modifiers Qty    55628473476 HC PT EVAL MOD COMPLEXITY 2 8/11/2021 Desmond Barahona, PT Student GP 1    81203960966 HC PT THER PROC EA 15 MIN 8/11/2021 Desmond Barahona, PT Student GP 1    15137415167 HC PT THER SUPP EA 15 MIN 8/11/2021 Desmond Barahona, PT Student GP 1          PT G-Codes  Outcome Measure Options: AM-PAC 6 Clicks Basic Mobility (PT)  AM-PAC 6 Clicks Score (PT): 17    Desmond Barahona, PT Student  8/11/2021

## 2021-08-11 NOTE — PLAN OF CARE
Goal Outcome Evaluation:  Plan of Care Reviewed With: patient           Outcome Summary: Pt is a 55 y.o. male that presents to the hospital with CAD and underwent a CABG x8. Pt lives at home with spouse and has 6 [address] house. Pt also is A&O x4 and agreeable to ambulating with PT. Upon PT evaluation, pt required Min-A x2 for sit-stand using HHA and CGA/Min-A x2 for ambulation of 60' using HHA and a rest break at 30'. Pt would benefit from skilled PT and is recommended to go home with assist and HH upon discharge

## 2021-08-11 NOTE — DISCHARGE PLACEMENT REQUEST
"Marcell Gunn (55 y.o. Male)     Date of Birth Social Security Number Address Home Phone MRN    1965  [address] [address] [phone] [id number]    Scientologist Marital Status          [employer]        Admission Date Admission Type Admitting Provider Attending Provider Department, Room/Bed    8/9/21 Urgent Jr Robbin Patten MD [place], Jr Samuel B, MD [facility] CARDIOVASC UNIT, 2221/1    Discharge Date Discharge Disposition Discharge Destination                       Attending Provider: Jr Robbin Patten MD    Allergies: No Known Allergies    Isolation: None   Infection: None   Code Status: CPR    Ht: 170.2 cm (67\")   Wt: 109 kg (239 lb 12.8 oz)    Admission Cmt: None   Principal Problem: Coronary artery disease [I25.10]                 Active Insurance as of 8/9/2021     Primary Coverage     Payor Plan Insurance Group Employer/Plan Group    [facility] [id number]     Payor Plan Address Payor Plan Phone Number Payor Plan Fax Number Effective Dates    [address]   1/1/2021 - None Entered    [facility] [place] [id number]       Subscriber Name Subscriber Birth Date Member ID       MARCELL GUNN 1965 [id number]                 Emergency Contacts      (Rel.) Home Phone Work Phone Mobile Phone    JUANPABLOYARED (Spouse) [phone] -- --            "

## 2021-08-12 ENCOUNTER — APPOINTMENT (OUTPATIENT)
Dept: GENERAL RADIOLOGY | Facility: HOSPITAL | Age: 56
End: 2021-08-12

## 2021-08-12 LAB
ANION GAP SERPL CALCULATED.3IONS-SCNC: 13.6 MMOL/L (ref 5–15)
BUN SERPL-MCNC: 21 MG/DL (ref 6–20)
BUN/CREAT SERPL: 24.4 (ref 7–25)
CALCIUM SPEC-SCNC: 8.5 MG/DL (ref 8.6–10.5)
CHLORIDE SERPL-SCNC: 99 MMOL/L (ref 98–107)
CO2 SERPL-SCNC: 23.4 MMOL/L (ref 22–29)
CREAT SERPL-MCNC: 0.86 MG/DL (ref 0.76–1.27)
DEPRECATED RDW RBC AUTO: 45.2 FL (ref 37–54)
ERYTHROCYTE [DISTWIDTH] IN BLOOD BY AUTOMATED COUNT: 12.7 % (ref 12.3–15.4)
GFR SERPL CREATININE-BSD FRML MDRD: 93 ML/MIN/1.73
GLUCOSE BLDC GLUCOMTR-MCNC: 147 MG/DL (ref 70–130)
GLUCOSE BLDC GLUCOMTR-MCNC: 159 MG/DL (ref 70–130)
GLUCOSE BLDC GLUCOMTR-MCNC: 209 MG/DL (ref 70–130)
GLUCOSE BLDC GLUCOMTR-MCNC: 229 MG/DL (ref 70–130)
GLUCOSE SERPL-MCNC: 166 MG/DL (ref 65–99)
HCT VFR BLD AUTO: 26.6 % (ref 37.5–51)
HGB BLD-MCNC: 9.2 G/DL (ref 13–17.7)
MCH RBC QN AUTO: 34.1 PG (ref 26.6–33)
MCHC RBC AUTO-ENTMCNC: 34.6 G/DL (ref 31.5–35.7)
MCV RBC AUTO: 98.5 FL (ref 79–97)
PLATELET # BLD AUTO: 168 10*3/MM3 (ref 140–450)
PMV BLD AUTO: 10 FL (ref 6–12)
POTASSIUM SERPL-SCNC: 3.8 MMOL/L (ref 3.5–5.2)
QT INTERVAL: 373 MS
RBC # BLD AUTO: 2.7 10*6/MM3 (ref 4.14–5.8)
SODIUM SERPL-SCNC: 136 MMOL/L (ref 136–145)
WBC # BLD AUTO: 17.45 10*3/MM3 (ref 3.4–10.8)

## 2021-08-12 PROCEDURE — 93010 ELECTROCARDIOGRAM REPORT: CPT | Performed by: INTERNAL MEDICINE

## 2021-08-12 PROCEDURE — 71045 X-RAY EXAM CHEST 1 VIEW: CPT

## 2021-08-12 PROCEDURE — 94640 AIRWAY INHALATION TREATMENT: CPT

## 2021-08-12 PROCEDURE — 94799 UNLISTED PULMONARY SVC/PX: CPT

## 2021-08-12 PROCEDURE — 25010000002 FUROSEMIDE PER 20 MG: Performed by: NURSE PRACTITIONER

## 2021-08-12 PROCEDURE — 99024 POSTOP FOLLOW-UP VISIT: CPT | Performed by: NURSE PRACTITIONER

## 2021-08-12 PROCEDURE — 25010000002 ENOXAPARIN PER 10 MG: Performed by: NURSE PRACTITIONER

## 2021-08-12 PROCEDURE — 93005 ELECTROCARDIOGRAM TRACING: CPT | Performed by: THORACIC SURGERY (CARDIOTHORACIC VASCULAR SURGERY)

## 2021-08-12 PROCEDURE — 80048 BASIC METABOLIC PNL TOTAL CA: CPT | Performed by: NURSE PRACTITIONER

## 2021-08-12 PROCEDURE — 85027 COMPLETE CBC AUTOMATED: CPT | Performed by: NURSE PRACTITIONER

## 2021-08-12 PROCEDURE — 25010000003 CEFAZOLIN IN DEXTROSE 2-4 GM/100ML-% SOLUTION: Performed by: NURSE PRACTITIONER

## 2021-08-12 PROCEDURE — 97110 THERAPEUTIC EXERCISES: CPT

## 2021-08-12 PROCEDURE — 63710000001 INSULIN LISPRO (HUMAN) PER 5 UNITS: Performed by: NURSE PRACTITIONER

## 2021-08-12 PROCEDURE — 82962 GLUCOSE BLOOD TEST: CPT

## 2021-08-12 RX ORDER — POTASSIUM CHLORIDE 750 MG/1
20 TABLET, FILM COATED, EXTENDED RELEASE ORAL ONCE
Status: COMPLETED | OUTPATIENT
Start: 2021-08-12 | End: 2021-08-12

## 2021-08-12 RX ORDER — ACETYLCYSTEINE 200 MG/ML
3 SOLUTION ORAL; RESPIRATORY (INHALATION)
Status: COMPLETED | OUTPATIENT
Start: 2021-08-12 | End: 2021-08-14

## 2021-08-12 RX ORDER — METOPROLOL TARTRATE 50 MG/1
50 TABLET, FILM COATED ORAL EVERY 12 HOURS SCHEDULED
Status: DISCONTINUED | OUTPATIENT
Start: 2021-08-12 | End: 2021-08-15 | Stop reason: HOSPADM

## 2021-08-12 RX ORDER — NITROGLYCERIN 0.4 MG/1
0.4 TABLET SUBLINGUAL
Status: DISCONTINUED | OUTPATIENT
Start: 2021-08-12 | End: 2021-08-15 | Stop reason: HOSPADM

## 2021-08-12 RX ORDER — FUROSEMIDE 10 MG/ML
40 INJECTION INTRAMUSCULAR; INTRAVENOUS ONCE
Status: COMPLETED | OUTPATIENT
Start: 2021-08-12 | End: 2021-08-12

## 2021-08-12 RX ORDER — IPRATROPIUM BROMIDE AND ALBUTEROL SULFATE 2.5; .5 MG/3ML; MG/3ML
3 SOLUTION RESPIRATORY (INHALATION) EVERY 4 HOURS PRN
Status: DISCONTINUED | OUTPATIENT
Start: 2021-08-12 | End: 2021-08-15 | Stop reason: HOSPADM

## 2021-08-12 RX ADMIN — IPRATROPIUM BROMIDE AND ALBUTEROL SULFATE 3 ML: 2.5; .5 SOLUTION RESPIRATORY (INHALATION) at 10:04

## 2021-08-12 RX ADMIN — HYDROCODONE BITARTRATE AND ACETAMINOPHEN 2 TABLET: 5; 325 TABLET ORAL at 22:32

## 2021-08-12 RX ADMIN — INSULIN LISPRO 4 UNITS: 100 INJECTION, SOLUTION INTRAVENOUS; SUBCUTANEOUS at 11:29

## 2021-08-12 RX ADMIN — SODIUM CHLORIDE, PRESERVATIVE FREE 10 ML: 5 INJECTION INTRAVENOUS at 08:12

## 2021-08-12 RX ADMIN — GABAPENTIN 200 MG: 100 CAPSULE ORAL at 20:30

## 2021-08-12 RX ADMIN — CEFAZOLIN SODIUM 2 G: 2 INJECTION, SOLUTION INTRAVENOUS at 01:58

## 2021-08-12 RX ADMIN — CHLORHEXIDINE GLUCONATE 15 ML: 1.2 RINSE ORAL at 20:29

## 2021-08-12 RX ADMIN — METOPROLOL TARTRATE 50 MG: 50 TABLET, FILM COATED ORAL at 20:29

## 2021-08-12 RX ADMIN — SODIUM CHLORIDE, PRESERVATIVE FREE 10 ML: 5 INJECTION INTRAVENOUS at 20:15

## 2021-08-12 RX ADMIN — INSULIN LISPRO 4 UNITS: 100 INJECTION, SOLUTION INTRAVENOUS; SUBCUTANEOUS at 17:05

## 2021-08-12 RX ADMIN — GUAIFENESIN 1200 MG: 600 TABLET, EXTENDED RELEASE ORAL at 08:12

## 2021-08-12 RX ADMIN — AMIODARONE HYDROCHLORIDE 300 MG: 200 TABLET ORAL at 20:29

## 2021-08-12 RX ADMIN — ENOXAPARIN SODIUM 40 MG: 40 INJECTION SUBCUTANEOUS at 17:05

## 2021-08-12 RX ADMIN — CHLORHEXIDINE GLUCONATE 15 ML: 1.2 RINSE ORAL at 08:12

## 2021-08-12 RX ADMIN — AMIODARONE HYDROCHLORIDE 300 MG: 200 TABLET ORAL at 08:12

## 2021-08-12 RX ADMIN — GABAPENTIN 200 MG: 100 CAPSULE ORAL at 08:12

## 2021-08-12 RX ADMIN — ACETYLCYSTEINE 3 ML: 200 SOLUTION ORAL; RESPIRATORY (INHALATION) at 10:03

## 2021-08-12 RX ADMIN — DOCUSATE SODIUM 50MG AND SENNOSIDES 8.6MG 2 TABLET: 8.6; 5 TABLET, FILM COATED ORAL at 20:29

## 2021-08-12 RX ADMIN — ASPIRIN 81 MG: 81 TABLET, COATED ORAL at 08:12

## 2021-08-12 RX ADMIN — METOPROLOL TARTRATE 25 MG: 25 TABLET, FILM COATED ORAL at 08:12

## 2021-08-12 RX ADMIN — PANTOPRAZOLE SODIUM 40 MG: 40 TABLET, DELAYED RELEASE ORAL at 06:28

## 2021-08-12 RX ADMIN — MUPIROCIN 1 APPLICATION: 20 OINTMENT TOPICAL at 20:29

## 2021-08-12 RX ADMIN — GUAIFENESIN 1200 MG: 600 TABLET, EXTENDED RELEASE ORAL at 20:29

## 2021-08-12 RX ADMIN — HYDROCODONE BITARTRATE AND ACETAMINOPHEN 2 TABLET: 5; 325 TABLET ORAL at 05:16

## 2021-08-12 RX ADMIN — METOPROLOL TARTRATE 12.5 MG: 25 TABLET, FILM COATED ORAL at 17:05

## 2021-08-12 RX ADMIN — IPRATROPIUM BROMIDE AND ALBUTEROL SULFATE 3 ML: 2.5; .5 SOLUTION RESPIRATORY (INHALATION) at 20:05

## 2021-08-12 RX ADMIN — ACETYLCYSTEINE 3 ML: 200 SOLUTION ORAL; RESPIRATORY (INHALATION) at 20:05

## 2021-08-12 RX ADMIN — CYCLOBENZAPRINE 10 MG: 10 TABLET, FILM COATED ORAL at 06:28

## 2021-08-12 RX ADMIN — MUPIROCIN 1 APPLICATION: 20 OINTMENT TOPICAL at 08:12

## 2021-08-12 RX ADMIN — ATORVASTATIN CALCIUM 40 MG: 20 TABLET, FILM COATED ORAL at 20:29

## 2021-08-12 RX ADMIN — METOPROLOL TARTRATE 12.5 MG: 25 TABLET, FILM COATED ORAL at 09:57

## 2021-08-12 RX ADMIN — HYDROCODONE BITARTRATE AND ACETAMINOPHEN 2 TABLET: 5; 325 TABLET ORAL at 11:29

## 2021-08-12 RX ADMIN — FUROSEMIDE 40 MG: 10 INJECTION, SOLUTION INTRAMUSCULAR; INTRAVENOUS at 09:56

## 2021-08-12 RX ADMIN — HYDROCODONE BITARTRATE AND ACETAMINOPHEN 2 TABLET: 5; 325 TABLET ORAL at 17:08

## 2021-08-12 RX ADMIN — POTASSIUM CHLORIDE 20 MEQ: 750 TABLET, EXTENDED RELEASE ORAL at 09:56

## 2021-08-12 NOTE — THERAPY TREATMENT NOTE
Patient Name: Marcell Gunn  : 1965    MRN: [id number]                              Today's Date: 2021       Admit Date: 2021    Visit Dx:     ICD-10-CM ICD-9-CM   1. S/P CABG (coronary artery bypass graft)  Z95.1 V45.81     Patient Active Problem List   Diagnosis   • Coronary artery disease   • Chest pain   • CAD (coronary artery disease)     Past Medical History:   Diagnosis Date   • Coronary artery disease    • Hyperlipidemia    • Hypertension      Past Surgical History:   Procedure Laterality Date   • CARDIAC CATHETERIZATION Left 2021    Procedure: Left Cardiac Catheterization;  Surgeon: Calderon Estes MD;  [facility];  Service: Cardiovascular;  Laterality: Left;   • CORONARY ARTERY BYPASS GRAFT N/A 8/10/2021    Procedure: MEDIAN STERNOTOMY, CORONARY ARTERY BYPASS GRAFTING X  8  UTILIZING LEFT INTERNAL MAMMARY ARTERY AND LEFT ENDOSCOPICALLY HARVESTED SAPHENOUS VEIN, PRP;  Surgeon: Jr Robbin Patten MD;  Location: [facility];  Service: Cardiothoracic;  Laterality: N/A;   • TRANSESOPHAGEAL ECHOCARDIOGRAM (JABIER) N/A 8/10/2021    Procedure: TRANSESOPHAGEAL ECHOCARDIOGRAM WITH ANESTHESIA;  Surgeon: Jr Robbin Patten MD;  Location: [facility];  Service: Cardiothoracic;  Laterality: N/A;     General Information     Row Name 21 1052          Physical Therapy Time and Intention    Document Type  therapy note (daily note)  -CH (r) ME (t) CH (c)     Mode of Treatment  individual therapy;physical therapy  -CH (r) ME (t) CH (c)     Row Name 21 105          General Information    Patient Profile Reviewed  yes  -CH (r) ME (t) CH (c)     Existing Precautions/Restrictions  fall;cardiac;sternal  -CH (r) ME (t) CH (c)     Row Name 21 1051          Cognition    Orientation Status (Cognition)  oriented x 3  -CH (r) ME (t) CH (c)     Row Name 21 1052          Safety Issues, Functional Mobility    Impairments Affecting Function (Mobility)   balance;coordination;endurance/activity tolerance;shortness of breath;strength  -CH (r) ME (t) CH (c)     Comment, Safety Issues/Impairments (Mobility)  Pt struggles to cough, which is causing him to take short breaths. PT instructed pt to take deep breaths in through the nose and out through the mouth.  -CH (r) ME (t) CH (c)       User Key  (r) = Recorded By, (t) = Taken By, (c) = Cosigned By    Initials Name Provider Type    Stefany Schmid, PT Physical Therapist    ME Desmond Barahona, PT Student PT Student        Mobility     Row Name 08/12/21 1101          Bed Mobility    Bed Mobility  sit-supine  -CH (r) ME (t) CH (c)     Sit-Supine [place] (Bed Mobility)  1 person assist;moderate assist (50% patient effort)  -CH (r) ME (t) CH (c)     Assistive Device (Bed Mobility)  bed rails;head of bed elevated  -CH (r) ME (t) CH (c)     Comment (Bed Mobility)  Started in the chair  -CH (r) ME (t) CH (c)     Row Name 08/12/21 1101          Sit-Stand Transfer    Sit-Stand [place] (Transfers)  contact guard;minimum assist (75% patient effort);2 person assist;1 person to manage equipment  -CH (r) ME (t) CH (c)     Assistive Device (Sit-Stand Transfers)  -- HHA  -CH (r) ME (t) CH (c)     Row Name 08/12/21 1101          Gait/Stairs (Locomotion)    [place] Level (Gait)  minimum assist (75% patient effort);contact guard;2 person assist;1 person to manage equipment  -CH (r) ME (t) CH (c)     Assistive Device (Gait)  -- HHA  -CH (r) ME (t) CH (c)     Distance in Feet (Gait)  80'  -CH (r) ME (t) CH (c)     Deviations/Abnormal Patterns (Gait)  ramiro decreased;gait speed decreased;stride length decreased  -CH (r) ME (t) CH (c)     Bilateral Gait Deviations  forward flexed posture  -CH (r) ME (t) CH (c)       User Key  (r) = Recorded By, (t) = Taken By, (c) = Cosigned By    Initials Name Provider Type    CH Stefany Bolaños, PT Physical Therapist    ME Desmond Barahona, PT Student PT Student         Obj/Interventions    No documentation.       Goals/Plan    No documentation.       Clinical Impression     Row Name 08/12/21 1103          Pain    Additional Documentation  Pain Scale: FACES Pre/Post-Treatment (Group)  -CH (r) ME (t) CH (c)     Row Name 08/12/21 1103          Pain Scale: FACES Pre/Post-Treatment    Pain: FACES Scale, Pretreatment  4-->hurts little more  -CH (r) ME (t) CH (c)     Posttreatment Pain Rating  4-->hurts little more  -CH (r) ME (t) CH (c)     Pain Location  chest  -CH (r) ME (t) CH (c)     Row Name 08/12/21 1103          Plan of Care Review    Plan of Care Reviewed With  patient  -CH (r) ME (t) CH (c)     Progress  improving  -CH (r) ME (t) CH (c)     Outcome Summary  Pt improving with transfers and ambulation at this time. During PT tx, pt was requiring mod-A x1 with LE for sit-supine, but only CGA/min-A x2 for sit-stand and ambulation of 80' using HHA. PT will continue to work with pt towards further progression  -CH (r) ME (t) CH (c)     Row Name 08/12/21 1103          Positioning and Restraints    Pre-Treatment Position  sitting in chair/recliner  -CH (r) ME (t) CH (c)     Post Treatment Position  bed  -CH (r) ME (t) CH (c)     In Bed  supine;encouraged to call for assist;call light within reach;exit alarm on;with family/caregiver;notified nsg  -CH (r) ME (t) CH (c)       User Key  (r) = Recorded By, (t) = Taken By, (c) = Cosigned By    Initials Name Provider Type    CH Stefany Bolaños, PT Physical Therapist    ME Desmond Barahona, PT Student PT Student        Outcome Measures     Row Name 08/12/21 1116          How much help from another person do you currently need...    Turning from your back to your side while in flat bed without using bedrails?  3  -CH (r) ME (t) CH (c)     Moving from lying on back to sitting on the side of a flat bed without bedrails?  2  -CH (r) ME (t) CH (c)     Moving to and from a bed to a chair (including a wheelchair)?  3  -CH (r) ME (t) CH (c)      Standing up from a chair using your arms (e.g., wheelchair, bedside chair)?  3  -CH (r) ME (t) CH (c)     Climbing 3-5 steps with a railing?  2  -CH (r) ME (t) CH (c)     To walk in hospital room?  3  -CH (r) ME (t) CH (c)     AM-PAC 6 Clicks Score (PT)  16  -CH (r) ME (t)     Row Name 08/12/21 1116          Functional Assessment    Outcome Measure Options  AM-PAC 6 Clicks Basic Mobility (PT)  -CH (r) ME (t) CH (c)       User Key  (r) = Recorded By, (t) = Taken By, (c) = Cosigned By    Initials Name Provider Type     Stefany Bolaños, PT Physical Therapist    ME Desmond Barahona, PT Student PT Student                       Physical Therapy Education                 Title: PT OT SLP Therapies (In Progress)     Topic: Physical Therapy (In Progress)     Point: Mobility training (Not Started)     Learner Progress:  Not documented in this visit.          Point: Home exercise program (Not Started)     Learner Progress:  Not documented in this visit.          Point: Body mechanics (Not Started)     Learner Progress:  Not documented in this visit.          Point: Precautions (Done)     Learning Progress Summary           Patient Acceptance, E, VU by ME at 8/12/2021 1122    Acceptance, E, VU by ME at 8/11/2021 0915                               User Key     Initials Effective Dates Name Provider Type [facility] 07/01/21 -  Desmond Barahona, PT Student PT Student PT              PT Recommendation and Plan     Plan of Care Reviewed With: patient  Progress: improving  Outcome Summary: Pt improving with transfers and ambulation at this time. During PT tx, pt was requiring mod-A x1 with LE for sit-supine, but only CGA/min-A x2 for sit-stand and ambulation of 80' using HHA. PT will continue to work with pt towards further progression     Time Calculation:   PT Charges     Row Name 08/12/21 1058             Time Calculation    Start Time  1019  -CH (r) ME (t) CH (c)      Stop Time  1036  -CH (r) ME (t) CH (c)      Time  Calculation (min)  17 min  -CH (r) ME (t)      PT Received On  08/12/21  -CH (r) ME (t) CH (c)      PT - Next Appointment  08/13/21  -CH (r) ME (t) CH (c)        User Key  (r) = Recorded By, (t) = Taken By, (c) = Cosigned By    Initials Name Provider Type     Stefany Bolaños, PT Physical Therapist    ME Desmond Barahona, PT Student PT Student        Therapy Charges for Today     Code Description Service Date Service Provider Modifiers Qty    57206938994  PT EVAL MOD COMPLEXITY 2 8/11/2021 Desmond Barahona, PT Student GP 1    [id number]  PT THER PROC EA 15 MIN 8/11/2021 Desmond Barahona, PT Student GP 1    [id number]  PT THER SUPP EA 15 MIN 8/11/2021 Desmond Barahona, PT Student GP 1    [id number]  PT THER PROC EA 15 MIN 8/12/2021 Desmond Barahona, PT Student GP 1    [id number]  PT THER SUPP EA 15 MIN 8/12/2021 Desmond Barahona, PT Student GP 1          PT G-Codes  Outcome Measure Options: AM-PAC 6 Clicks Basic Mobility (PT)  AM-PAC 6 Clicks Score (PT): 16    Desmond Barahona, PT Student  8/12/2021

## 2021-08-12 NOTE — PLAN OF CARE
Goal Outcome Evaluation:      CT x2 and IJ dc'd. Pt tolerated it well. Vitals stable. F/C dc at 1530 . Due to void at 10pm. Ambulated with PT well will cont to monitor

## 2021-08-12 NOTE — PLAN OF CARE
Goal Outcome Evaluation:  Plan of Care Reviewed With: patient        Progress: improving  Outcome Summary: Pt improving with transfers and ambulation at this time. During PT tx, pt was requiring mod-A x1 with LE for sit-supine, but only CGA/min-A x2 for sit-stand and ambulation of 80' using HHA. PT will continue to work with pt towards further progression

## 2021-08-13 ENCOUNTER — APPOINTMENT (OUTPATIENT)
Dept: GENERAL RADIOLOGY | Facility: HOSPITAL | Age: 56
End: 2021-08-13

## 2021-08-13 LAB
ANION GAP SERPL CALCULATED.3IONS-SCNC: 11.6 MMOL/L (ref 5–15)
BH BB BLOOD EXPIRATION DATE: NORMAL
BH BB BLOOD EXPIRATION DATE: NORMAL
BH BB BLOOD TYPE BARCODE: 5100
BH BB BLOOD TYPE BARCODE: 5100
BH BB DISPENSE STATUS: NORMAL
BH BB DISPENSE STATUS: NORMAL
BH BB PRODUCT CODE: NORMAL
BH BB PRODUCT CODE: NORMAL
BH BB UNIT NUMBER: NORMAL
BH BB UNIT NUMBER: NORMAL
BUN SERPL-MCNC: 25 MG/DL (ref 6–20)
BUN/CREAT SERPL: 28.4 (ref 7–25)
CALCIUM SPEC-SCNC: 8.5 MG/DL (ref 8.6–10.5)
CHLORIDE SERPL-SCNC: 100 MMOL/L (ref 98–107)
CO2 SERPL-SCNC: 23.4 MMOL/L (ref 22–29)
CREAT SERPL-MCNC: 0.88 MG/DL (ref 0.76–1.27)
CROSSMATCH INTERPRETATION: NORMAL
CROSSMATCH INTERPRETATION: NORMAL
DEPRECATED RDW RBC AUTO: 45.8 FL (ref 37–54)
ERYTHROCYTE [DISTWIDTH] IN BLOOD BY AUTOMATED COUNT: 12.1 % (ref 12.3–15.4)
GFR SERPL CREATININE-BSD FRML MDRD: 90 ML/MIN/1.73
GLUCOSE BLDC GLUCOMTR-MCNC: 121 MG/DL (ref 70–130)
GLUCOSE BLDC GLUCOMTR-MCNC: 124 MG/DL (ref 70–130)
GLUCOSE BLDC GLUCOMTR-MCNC: 145 MG/DL (ref 70–130)
GLUCOSE BLDC GLUCOMTR-MCNC: 154 MG/DL (ref 70–130)
GLUCOSE SERPL-MCNC: 111 MG/DL (ref 65–99)
HCT VFR BLD AUTO: 26.5 % (ref 37.5–51)
HGB BLD-MCNC: 8.8 G/DL (ref 13–17.7)
MCH RBC QN AUTO: 34.2 PG (ref 26.6–33)
MCHC RBC AUTO-ENTMCNC: 33.2 G/DL (ref 31.5–35.7)
MCV RBC AUTO: 103.1 FL (ref 79–97)
PLATELET # BLD AUTO: 182 10*3/MM3 (ref 140–450)
PMV BLD AUTO: 10 FL (ref 6–12)
POTASSIUM SERPL-SCNC: 3.9 MMOL/L (ref 3.5–5.2)
QT INTERVAL: 379 MS
RBC # BLD AUTO: 2.57 10*6/MM3 (ref 4.14–5.8)
SODIUM SERPL-SCNC: 135 MMOL/L (ref 136–145)
UNIT  ABO: NORMAL
UNIT  ABO: NORMAL
UNIT  RH: NORMAL
UNIT  RH: NORMAL
WBC # BLD AUTO: 16.75 10*3/MM3 (ref 3.4–10.8)

## 2021-08-13 PROCEDURE — 71046 X-RAY EXAM CHEST 2 VIEWS: CPT

## 2021-08-13 PROCEDURE — 25010000002 ENOXAPARIN PER 10 MG: Performed by: NURSE PRACTITIONER

## 2021-08-13 PROCEDURE — 85027 COMPLETE CBC AUTOMATED: CPT | Performed by: NURSE PRACTITIONER

## 2021-08-13 PROCEDURE — 82962 GLUCOSE BLOOD TEST: CPT

## 2021-08-13 PROCEDURE — 94799 UNLISTED PULMONARY SVC/PX: CPT

## 2021-08-13 PROCEDURE — 80048 BASIC METABOLIC PNL TOTAL CA: CPT | Performed by: NURSE PRACTITIONER

## 2021-08-13 PROCEDURE — 93010 ELECTROCARDIOGRAM REPORT: CPT | Performed by: INTERNAL MEDICINE

## 2021-08-13 PROCEDURE — 93005 ELECTROCARDIOGRAM TRACING: CPT | Performed by: THORACIC SURGERY (CARDIOTHORACIC VASCULAR SURGERY)

## 2021-08-13 RX ORDER — POLYETHYLENE GLYCOL 3350 17 G/17G
17 POWDER, FOR SOLUTION ORAL DAILY
Status: DISCONTINUED | OUTPATIENT
Start: 2021-08-13 | End: 2021-08-15 | Stop reason: HOSPADM

## 2021-08-13 RX ORDER — POTASSIUM CHLORIDE 750 MG/1
20 TABLET, FILM COATED, EXTENDED RELEASE ORAL DAILY
Status: DISCONTINUED | OUTPATIENT
Start: 2021-08-13 | End: 2021-08-15 | Stop reason: HOSPADM

## 2021-08-13 RX ORDER — FUROSEMIDE 40 MG/1
40 TABLET ORAL DAILY
Status: DISCONTINUED | OUTPATIENT
Start: 2021-08-13 | End: 2021-08-15 | Stop reason: HOSPADM

## 2021-08-13 RX ADMIN — ACETYLCYSTEINE 3 ML: 200 SOLUTION ORAL; RESPIRATORY (INHALATION) at 07:06

## 2021-08-13 RX ADMIN — AMIODARONE HYDROCHLORIDE 300 MG: 200 TABLET ORAL at 08:55

## 2021-08-13 RX ADMIN — MUPIROCIN 1 APPLICATION: 20 OINTMENT TOPICAL at 08:56

## 2021-08-13 RX ADMIN — ASPIRIN 81 MG: 81 TABLET, COATED ORAL at 08:55

## 2021-08-13 RX ADMIN — GUAIFENESIN 1200 MG: 600 TABLET, EXTENDED RELEASE ORAL at 08:55

## 2021-08-13 RX ADMIN — HYDROCODONE BITARTRATE AND ACETAMINOPHEN 2 TABLET: 5; 325 TABLET ORAL at 10:35

## 2021-08-13 RX ADMIN — ACETYLCYSTEINE 3 ML: 200 SOLUTION ORAL; RESPIRATORY (INHALATION) at 21:10

## 2021-08-13 RX ADMIN — ENOXAPARIN SODIUM 40 MG: 40 INJECTION SUBCUTANEOUS at 17:20

## 2021-08-13 RX ADMIN — POLYETHYLENE GLYCOL 3350 17 G: 17 POWDER, FOR SOLUTION ORAL at 08:55

## 2021-08-13 RX ADMIN — MUPIROCIN 1 APPLICATION: 20 OINTMENT TOPICAL at 20:47

## 2021-08-13 RX ADMIN — SODIUM CHLORIDE, PRESERVATIVE FREE 10 ML: 5 INJECTION INTRAVENOUS at 08:56

## 2021-08-13 RX ADMIN — AMIODARONE HYDROCHLORIDE 300 MG: 200 TABLET ORAL at 20:47

## 2021-08-13 RX ADMIN — METOPROLOL TARTRATE 50 MG: 50 TABLET, FILM COATED ORAL at 08:55

## 2021-08-13 RX ADMIN — METOPROLOL TARTRATE 50 MG: 50 TABLET, FILM COATED ORAL at 20:47

## 2021-08-13 RX ADMIN — CHLORHEXIDINE GLUCONATE 15 ML: 1.2 RINSE ORAL at 20:47

## 2021-08-13 RX ADMIN — IPRATROPIUM BROMIDE AND ALBUTEROL SULFATE 3 ML: 2.5; .5 SOLUTION RESPIRATORY (INHALATION) at 21:10

## 2021-08-13 RX ADMIN — GABAPENTIN 200 MG: 100 CAPSULE ORAL at 20:47

## 2021-08-13 RX ADMIN — GUAIFENESIN 1200 MG: 600 TABLET, EXTENDED RELEASE ORAL at 20:47

## 2021-08-13 RX ADMIN — CHLORHEXIDINE GLUCONATE 15 ML: 1.2 RINSE ORAL at 08:56

## 2021-08-13 RX ADMIN — ATORVASTATIN CALCIUM 40 MG: 20 TABLET, FILM COATED ORAL at 20:47

## 2021-08-13 RX ADMIN — IPRATROPIUM BROMIDE AND ALBUTEROL SULFATE 3 ML: 2.5; .5 SOLUTION RESPIRATORY (INHALATION) at 07:05

## 2021-08-13 RX ADMIN — DOCUSATE SODIUM 50MG AND SENNOSIDES 8.6MG 2 TABLET: 8.6; 5 TABLET, FILM COATED ORAL at 20:47

## 2021-08-13 RX ADMIN — SODIUM CHLORIDE, PRESERVATIVE FREE 10 ML: 5 INJECTION INTRAVENOUS at 21:27

## 2021-08-13 RX ADMIN — POTASSIUM CHLORIDE 20 MEQ: 750 TABLET, EXTENDED RELEASE ORAL at 08:55

## 2021-08-13 RX ADMIN — GABAPENTIN 200 MG: 100 CAPSULE ORAL at 08:55

## 2021-08-13 RX ADMIN — FUROSEMIDE 40 MG: 40 TABLET ORAL at 08:55

## 2021-08-13 RX ADMIN — HYDROCODONE BITARTRATE AND ACETAMINOPHEN 2 TABLET: 5; 325 TABLET ORAL at 17:20

## 2021-08-13 RX ADMIN — PANTOPRAZOLE SODIUM 40 MG: 40 TABLET, DELAYED RELEASE ORAL at 05:30

## 2021-08-13 RX ADMIN — HYDROCODONE BITARTRATE AND ACETAMINOPHEN 2 TABLET: 5; 325 TABLET ORAL at 05:30

## 2021-08-13 NOTE — PLAN OF CARE
Goal Outcome Evaluation:  Plan of Care Reviewed With: patient        Progress: improving  Outcome Summary: Pt continues to improve with transfers and ambulation with PT. During tx, pt required CGA for both sit-stand and ambulation of 220'. Pt expressed that he feels that he can try stairs soon. Pt also harshal. PT well this am and is motivated to walk with [facility] staff and family. PT will continue to work with and progress pt during stay in hospital.

## 2021-08-13 NOTE — PROGRESS NOTES
LOS: 4 days   Patient Care Team:  Alan Prince DO as PCP - General (Family Medicine)    Chief Complaint: post op    Subjective:  Symptoms:  He reports cough.  No shortness of breath or chest pain.    Diet:  Adequate intake.  No nausea or vomiting.    Activity level: Returning to normal.    Pain:  He complains of pain that is mild.  Pain is well controlled and requiring pain medication.      Vital Signs  Temp:  [98.1 °F (36.7 °C)-99.8 °F (37.7 °C)] 99.8 °F (37.7 °C)  Heart Rate:  [] 103  Resp:  [16-20] 16  BP: (113-132)/(68-93) 121/73  Body mass index is 38.15 kg/m².    Intake/Output Summary (Last 24 hours) at 8/13/2021 0806  Last data filed at 8/13/2021 0600  Gross per 24 hour   Intake 1200 ml   Output 1000 ml   Net 200 ml     No intake/output data recorded.    Chest tube drainage last 8 hours: 40        08/09/21  1930 08/12/21  0715 08/13/21  0545   Weight: 109 kg (239 lb 12.8 oz) 111 kg (244 lb) 110 kg (243 lb 9.6 oz)     Objective:  General Appearance:  Comfortable and in no acute distress.    Vital signs: (most recent): Blood pressure 121/73, pulse 103, temperature 99.1 °F (37.3 °C), temperature source Oral, resp. rate 16, weight 110 kg (243 lb 9.6 oz), SpO2 92 %.  Vital signs are normal.  No fever.    Output: Producing urine.    Lungs:  Normal effort and normal respiratory rate.  There are rales and decreased breath sounds.    Heart: Tachycardia.  Regular rhythm.  (ST on tele monitor)  Abdomen: Abdomen is soft.  Bowel sounds are normal.     Extremities: There is dependent edema (mild bilateral LE).    Pulses: Distal pulses are intact.    Neurological: Patient is alert and oriented to person, place and time.    Skin:  Warm and dry.  (Sternal dressing clean, dry, and intact)        Results Review:        WBC WBC   Date Value Ref Range Status   08/13/2021 16.75 (H) 3.40 - 10.80 10*3/mm3 Final   08/12/2021 17.45 (H) 3.40 - 10.80 10*3/mm3 Final   08/11/2021 8.48 3.40 - 10.80 10*3/mm3 Final    08/10/2021 9.20 3.40 - 10.80 10*3/mm3 Final   08/10/2021 15.90 (H) 3.40 - 10.80 10*3/mm3 Final      HGB Hemoglobin   Date Value Ref Range Status   08/13/2021 8.8 (L) 13.0 - 17.7 g/dL Final   08/12/2021 9.2 (L) 13.0 - 17.7 g/dL Final   08/11/2021 9.8 (L) 13.0 - 17.7 g/dL Final   08/10/2021 10.0 (L) 13.0 - 17.7 g/dL Final   08/10/2021 11.9 (L) 13.0 - 17.7 g/dL Final      HCT Hematocrit   Date Value Ref Range Status   08/13/2021 26.5 (L) 37.5 - 51.0 % Final   08/12/2021 26.6 (L) 37.5 - 51.0 % Final   08/11/2021 28.4 (L) 37.5 - 51.0 % Final   08/10/2021 28.6 (L) 37.5 - 51.0 % Final   08/10/2021 34.9 (L) 37.5 - 51.0 % Final      Platelets Platelets   Date Value Ref Range Status   08/13/2021 182 140 - 450 10*3/mm3 Final   08/12/2021 168 140 - 450 10*3/mm3 Final   08/11/2021 118 (L) 140 - 450 10*3/mm3 Final   08/10/2021 130 (L) 140 - 450 10*3/mm3 Final   08/10/2021 162 140 - 450 10*3/mm3 Final        PT/INR:    Protime   Date Value Ref Range Status   08/11/2021 16.1 (H) 11.7 - 14.2 Seconds Final   08/10/2021 16.1 (H) 11.7 - 14.2 Seconds Final   /  INR   Date Value Ref Range Status   08/11/2021 1.31 (H) 0.90 - 1.10 Final   08/10/2021 1.31 (H) 0.90 - 1.10 Final       Sodium Sodium   Date Value Ref Range Status   08/13/2021 135 (L) 136 - 145 mmol/L Final   08/12/2021 136 136 - 145 mmol/L Final   08/11/2021 142 136 - 145 mmol/L Final   08/10/2021 140 136 - 145 mmol/L Final   08/10/2021 138 136 - 145 mmol/L Final      Potassium Potassium   Date Value Ref Range Status   08/13/2021 3.9 3.5 - 5.2 mmol/L Final     Comment:     Slight hemolysis detected by analyzer. Results may be affected.   08/12/2021 3.8 3.5 - 5.2 mmol/L Final   08/11/2021 3.8 3.5 - 5.2 mmol/L Final   08/10/2021 4.1 3.5 - 5.2 mmol/L Final   08/10/2021 4.0 3.5 - 5.2 mmol/L Final     Comment:     Slight hemolysis detected by analyzer. Results may be affected.      Chloride Chloride   Date Value Ref Range Status   08/13/2021 100 98 - 107 mmol/L Final   08/12/2021  99 98 - 107 mmol/L Final   08/11/2021 107 98 - 107 mmol/L Final   08/10/2021 107 98 - 107 mmol/L Final   08/10/2021 104 98 - 107 mmol/L Final      Bicarbonate CO2   Date Value Ref Range Status   08/13/2021 23.4 22.0 - 29.0 mmol/L Final   08/12/2021 23.4 22.0 - 29.0 mmol/L Final   08/11/2021 24.0 22.0 - 29.0 mmol/L Final   08/10/2021 22.3 22.0 - 29.0 mmol/L Final   08/10/2021 24.7 22.0 - 29.0 mmol/L Final      BUN BUN   Date Value Ref Range Status   08/13/2021 25 (H) 6 - 20 mg/dL Final   08/12/2021 21 (H) 6 - 20 mg/dL Final   08/11/2021 15 6 - 20 mg/dL Final   08/10/2021 15 6 - 20 mg/dL Final   08/10/2021 16 6 - 20 mg/dL Final      Creatinine Creatinine   Date Value Ref Range Status   08/13/2021 0.88 0.76 - 1.27 mg/dL Final   08/12/2021 0.86 0.76 - 1.27 mg/dL Final   08/11/2021 0.90 0.76 - 1.27 mg/dL Final   08/10/2021 1.06 0.76 - 1.27 mg/dL Final   08/10/2021 1.19 0.76 - 1.27 mg/dL Final      Calcium Calcium   Date Value Ref Range Status   08/13/2021 8.5 (L) 8.6 - 10.5 mg/dL Final   08/12/2021 8.5 (L) 8.6 - 10.5 mg/dL Final   08/11/2021 8.4 (L) 8.6 - 10.5 mg/dL Final   08/10/2021 7.9 (L) 8.6 - 10.5 mg/dL Final   08/10/2021 8.3 (L) 8.6 - 10.5 mg/dL Final      Magnesium Magnesium   Date Value Ref Range Status   08/11/2021 2.2 1.6 - 2.6 mg/dL Final   08/10/2021 2.5 1.6 - 2.6 mg/dL Final   08/10/2021 2.5 1.6 - 2.6 mg/dL Final          acetylcysteine, 3 mL, Nebulization, BID - RT  amiodarone, 300 mg, Oral, Q12H  aspirin, 81 mg, Oral, Daily  atorvastatin, 40 mg, Oral, Nightly  chlorhexidine, 15 mL, Mouth/Throat, Q12H  enoxaparin, 40 mg, Subcutaneous, Daily  furosemide, 40 mg, Oral, Daily  gabapentin, 200 mg, Oral, Q12H  guaiFENesin, 1,200 mg, Oral, Q12H  insulin lispro, 0-9 Units, Subcutaneous, 4x Daily With Meals & Nightly  metoprolol tartrate, 50 mg, Oral, Q12H  mupirocin, , Each Nare, BID  pantoprazole, 40 mg, Oral, QAM  potassium chloride, 20 mEq, Oral, Daily  senna-docusate sodium, 2 tablet, Oral, Nightly  sodium  chloride, 10 mL, Intravenous, Q12H      sodium chloride, 30 mL/hr, Last Rate: 30 mL/hr (08/10/21 1205)  sodium chloride, 30 mL/hr, Last Rate: 30 mL/hr (08/10/21 1205)      Patient Active Problem List   Diagnosis Code   • Coronary artery disease I25.10   • Chest pain R07.9   • CAD (coronary artery disease) I25.10       Assessment & Plan   - severe multivessel CAD-- s/p CABGx8 LIMA/LSVG- POD#3 [place]  - hypertension--stable  - hyperlipidemia--statin therapy  - expected blood loss anemia  - leukocytosis--probably reactive; trending down     Looks good this morning  Mobilize/aggressive pulmonary toilet-- continue add flutter valve/nebs  On 4L NC--wean as able  Transition to oral diuretics  Tolerating increase in beta blocker  Discontinue AV wires  Leave remaining chest tube another day  Continue routine care    EFREN Byers  08/13/21  08:06 EDT

## 2021-08-13 NOTE — THERAPY TREATMENT NOTE
Patient Name: Marcell Gunn  : 1965    MRN: [id number]                              Today's Date: 2021       Admit Date: 2021    Visit Dx:     ICD-10-CM ICD-9-CM   1. S/P CABG (coronary artery bypass graft)  Z95.1 V45.81     Patient Active Problem List   Diagnosis   • Coronary artery disease   • Chest pain   • CAD (coronary artery disease)     Past Medical History:   Diagnosis Date   • Coronary artery disease    • Hyperlipidemia    • Hypertension      Past Surgical History:   Procedure Laterality Date   • CARDIAC CATHETERIZATION Left 2021    Procedure: Left Cardiac Catheterization;  Surgeon: Calderon Estes MD;  Location: [facility] INVASIVE LOCATION;  Service: Cardiovascular;  Laterality: Left;   • CORONARY ARTERY BYPASS GRAFT N/A 8/10/2021    Procedure: MEDIAN STERNOTOMY, CORONARY ARTERY BYPASS GRAFTING X  8  UTILIZING LEFT INTERNAL MAMMARY ARTERY AND LEFT ENDOSCOPICALLY HARVESTED SAPHENOUS VEIN, PRP;  Surgeon: Jr Robbin Patten MD;  Location: [facility];  Service: Cardiothoracic;  Laterality: N/A;   • TRANSESOPHAGEAL ECHOCARDIOGRAM (JABIER) N/A 8/10/2021    Procedure: TRANSESOPHAGEAL ECHOCARDIOGRAM WITH ANESTHESIA;  Surgeon: Jr Robbin Patten MD;  Location: [facility];  Service: Cardiothoracic;  Laterality: N/A;     General Information     Row Name 21 100          Physical Therapy Time and Intention    Document Type  therapy note (daily note)  -MD CLARA HAYWARD (r t) (c)     Mode of Treatment  individual therapy;physical therapy  -MD CLARA muller (r t))     Row Name 21 100          General Information    Patient Profile Reviewed  yes  -MD miller) CLARA perez) MD (c)     Existing Precautions/Restrictions  fall;cardiac;sternal  -MD CLARA muller (r t))     Row Name 21 1005          Cognition    Orientation Status (Cognition)  oriented x 3  -MD CLARA Lundberg (r t)c)     Row Name 21 1005          Safety Issues, Functional Mobility    Impairments Affecting Function (Mobility)   balance;coordination;endurance/activity tolerance;shortness of breath;strength  -MD (r) ME (t) MD (c)       User Key  (r) = Recorded By, (t) = Taken By, (c) = Cosigned By    Initials Name Provider Type    Sonia Hill, PT Physical Therapist    Desmond Kimbrough, PT Student PT Student        Mobility     Row Name 08/13/21 1005          Bed Mobility    Comment (Bed Mobility)  Started in the chair  -MD (r) ME (t) MD (c)     Row Name 08/13/21 1005          Sit-Stand Transfer    Sit-Stand [place] (Transfers)  contact guard;verbal cues;2 person assist;1 person to manage equipment  -MD (r) ME (t) MD (c)     Row Name 08/13/21 1005          Gait/Stairs (Locomotion)    [place] Level (Gait)  contact guard;1 person to manage equipment;2 person assist  -MD (r) ME (t) MD (c)     Distance in Feet (Gait)  220'  -MD (r) ME (t) MD (c)     Deviations/Abnormal Patterns (Gait)  gait speed decreased;stride length decreased  -MD (r) ME (t) MD (c)     Comment (Gait/Stairs)  Pt able to harshal. more activity and is less congested in his chest.  -MD (r) ME (t) MD (c)       User Key  (r) = Recorded By, (t) = Taken By, (c) = Cosigned By    Initials Name Provider Type    Sonia Hill, PT Physical Therapist    Desmond Kimbrough, PT Student PT Student        Obj/Interventions     Row Name 08/13/21 1009          Balance    Static Sitting Balance  WFL  -MD (r) ME (t) MD (c)     Dynamic Sitting Balance  WFL  -MD (r) ME (t) MD (c)     Static Standing Balance  WFL  -MD (r) ME (t) MD (c)     Dynamic Standing Balance  WNL  -MD (r) ME (t) MD (c)       User Key  (r) = Recorded By, (t) = Taken By, (c) = Cosigned By    Initials Name Provider Type    Sonia Hill, PT Physical Therapist    Desmond Kimbrough, PT Student PT Student        Goals/Plan    No documentation.       Clinical Impression     Row Name 08/13/21 1009          Pain Scale: FACES Pre/Post-Treatment    Pain: FACES Scale, Pretreatment  0-->no hurt  -MD (r) ME (t) MD (c)      Posttreatment Pain Rating  0-->no hurt  -MD CLARA (r) (max) MD (maicol)     Row Name 08/13/21 1009          Plan of Care Review    Plan of Care Reviewed With  patient  -MD CLARA muller (r t))     Progress  improving  -MD CLARA muller (r t))     Outcome Summary  Pt continues to improve with transfers and ambulation with PT. During tx, pt required CGA for both sit-stand and ambulation of 220'. Pt expressed that he feels that he can try stairs soon. Pt also harshal. PT well this am and is motivated to walk with [facility] staff and family. PT will continue to work with and progress pt during stay in hospital.  -MD CLARA perez (r)) MD muller)     Row Name 08/13/21 1009          Positioning and Restraints    Pre-Treatment Position  sitting in chair/recliner  -MD CLARA muller (r t))     Post Treatment Position  chair  -MD CLARA muller (r t))     In Chair  sitting;encouraged to call for assist;notified nsg;call light within reach;with family/caregiver  -MD CLARA (r) (max) MD (maicol)       User Key  (r) = Recorded By, (t) = Taken By, (c) = Cosigned By    Initials Name Provider Type    Sonia Hill, PT Physical Therapist    Desmond Kimbrough, PT Student PT Student        Outcome Measures     Row Name 08/13/21 1013          How much help from another person do you currently need...    Turning from your back to your side while in flat bed without using bedrails?  3  -MD CLARA muller (r t))     Moving from lying on back to sitting on the side of a flat bed without bedrails?  3  -MD CLARA muller (r t))     Moving to and from a bed to a chair (including a wheelchair)?  3  -MD CLARA muller (r t))     Standing up from a chair using your arms (e.g., wheelchair, bedside chair)?  3  -MD CLARA muller (r t))     Climbing 3-5 steps with a railing?  3  -MD CLARA muller (r t))     To walk in hospital room?  3  -MD CLARA muller (r t))     AM-PAC 6 Clicks Score (PT)  18  -MD (r) ME (t)     Row Name 08/13/21 1013          Functional Assessment    Outcome Measure Options  AM-PAC 6 Clicks Basic  Mobility (PT)  -MD CLARA perez (r)) MD (maicol)       User Key  (r) = Recorded By, (t) = Taken By, (c) = Cosigned By    Initials Name Provider Type    Sonia Hill, PT Physical Therapist    ME Desmond Barahona, PT Student PT Student                       Physical Therapy Education                 Title: PT OT SLP Therapies (In Progress)     Topic: Physical Therapy (In Progress)     Point: Mobility training (Not Started)     Learner Progress:  Not documented in this visit.          Point: Home exercise program (Not Started)     Learner Progress:  Not documented in this visit.          Point: Body mechanics (Not Started)     Learner Progress:  Not documented in this visit.          Point: Precautions (Done)     Learning Progress Summary           Patient Acceptance, E, VU by ME at 8/13/2021 1013    Acceptance, E, VU by ME at 8/12/2021 1122    Acceptance, E, VU by ME at 8/11/2021 0915                               User Key     Initials Effective Dates Name Provider Type Discipline    ME 07/01/21 -  Desmond Barahona, PT Student PT Student PT              PT Recommendation and Plan     Plan of Care Reviewed With: patient  Progress: improving  Outcome Summary: Pt continues to improve with transfers and ambulation with PT. During tx, pt required CGA for both sit-stand and ambulation of 220'. Pt expressed that he feels that he can try stairs soon. Pt also harshal. PT well this am and is motivated to walk with [facility] staff and family. PT will continue to work with and progress pt during stay in hospital.     Time Calculation:   PT Charges     Row Name 08/13/21 0949             Time Calculation    Start Time  0909  -MD CLARA perez (r)) MD (maicol)      Stop Time  0918  -MD CLARA perez (r)) MD muller)      Time Calculation (min)  9 min  -MD CLARA perez (r))      PT Received On  08/13/21  -MD CLARA muller (r t))      PT - Next Appointment  08/14/21  -MD CLARA perez (r)) MD muller)        User Key  (r) = Recorded By, (t) = Taken By, (c) = Cosigned By    Initials Name Provider Type     MD Crow, Sonia, PT Physical Therapist    ME Desmond Barahona, PT Student PT Student        Therapy Charges for Today     Code Description Service Date Service Provider Modifiers Qty    89002930142 HC PT THER PROC EA 15 MIN 8/12/2021 Desmond Barahona, PT Student GP 1    27286990001 HC PT THER SUPP EA 15 MIN 8/12/2021 Desmond Barahona, PT Student GP 1          PT G-Codes  Outcome Measure Options: AM-PAC 6 Clicks Basic Mobility (PT)  AM-PAC 6 Clicks Score (PT): 18    Desmond Barahona, PT Student  8/13/2021

## 2021-08-14 LAB
ANION GAP SERPL CALCULATED.3IONS-SCNC: 11.9 MMOL/L (ref 5–15)
BUN SERPL-MCNC: 22 MG/DL (ref 6–20)
BUN/CREAT SERPL: 32.4 (ref 7–25)
CALCIUM SPEC-SCNC: 8.2 MG/DL (ref 8.6–10.5)
CHLORIDE SERPL-SCNC: 101 MMOL/L (ref 98–107)
CO2 SERPL-SCNC: 23.1 MMOL/L (ref 22–29)
CREAT SERPL-MCNC: 0.68 MG/DL (ref 0.76–1.27)
DEPRECATED RDW RBC AUTO: 45.7 FL (ref 37–54)
ERYTHROCYTE [DISTWIDTH] IN BLOOD BY AUTOMATED COUNT: 12.5 % (ref 12.3–15.4)
GFR SERPL CREATININE-BSD FRML MDRD: 122 ML/MIN/1.73
GLUCOSE BLDC GLUCOMTR-MCNC: 103 MG/DL (ref 70–130)
GLUCOSE BLDC GLUCOMTR-MCNC: 123 MG/DL (ref 70–130)
GLUCOSE BLDC GLUCOMTR-MCNC: 131 MG/DL (ref 70–130)
GLUCOSE BLDC GLUCOMTR-MCNC: 173 MG/DL (ref 70–130)
GLUCOSE SERPL-MCNC: 111 MG/DL (ref 65–99)
HCT VFR BLD AUTO: 24.1 % (ref 37.5–51)
HGB BLD-MCNC: 8 G/DL (ref 13–17.7)
MCH RBC QN AUTO: 33.6 PG (ref 26.6–33)
MCHC RBC AUTO-ENTMCNC: 33.2 G/DL (ref 31.5–35.7)
MCV RBC AUTO: 101.3 FL (ref 79–97)
PLATELET # BLD AUTO: 189 10*3/MM3 (ref 140–450)
PMV BLD AUTO: 10.8 FL (ref 6–12)
POTASSIUM SERPL-SCNC: 3.8 MMOL/L (ref 3.5–5.2)
RBC # BLD AUTO: 2.38 10*6/MM3 (ref 4.14–5.8)
SODIUM SERPL-SCNC: 136 MMOL/L (ref 136–145)
WBC # BLD AUTO: 12.94 10*3/MM3 (ref 3.4–10.8)

## 2021-08-14 PROCEDURE — 94799 UNLISTED PULMONARY SVC/PX: CPT

## 2021-08-14 PROCEDURE — 63710000001 INSULIN LISPRO (HUMAN) PER 5 UNITS: Performed by: NURSE PRACTITIONER

## 2021-08-14 PROCEDURE — 80048 BASIC METABOLIC PNL TOTAL CA: CPT | Performed by: NURSE PRACTITIONER

## 2021-08-14 PROCEDURE — 82962 GLUCOSE BLOOD TEST: CPT

## 2021-08-14 PROCEDURE — 85027 COMPLETE CBC AUTOMATED: CPT | Performed by: NURSE PRACTITIONER

## 2021-08-14 PROCEDURE — 94762 N-INVAS EAR/PLS OXIMTRY CONT: CPT

## 2021-08-14 PROCEDURE — 97110 THERAPEUTIC EXERCISES: CPT

## 2021-08-14 PROCEDURE — 25010000002 ENOXAPARIN PER 10 MG: Performed by: NURSE PRACTITIONER

## 2021-08-14 PROCEDURE — 99024 POSTOP FOLLOW-UP VISIT: CPT | Performed by: NURSE PRACTITIONER

## 2021-08-14 RX ORDER — IRON POLYSACCHARIDE COMPLEX 150 MG
150 CAPSULE ORAL DAILY
Status: DISCONTINUED | OUTPATIENT
Start: 2021-08-14 | End: 2021-08-15 | Stop reason: HOSPADM

## 2021-08-14 RX ORDER — AMIODARONE HYDROCHLORIDE 200 MG/1
200 TABLET ORAL EVERY 12 HOURS SCHEDULED
Status: DISCONTINUED | OUTPATIENT
Start: 2021-08-14 | End: 2021-08-15 | Stop reason: HOSPADM

## 2021-08-14 RX ADMIN — SODIUM CHLORIDE, PRESERVATIVE FREE 10 ML: 5 INJECTION INTRAVENOUS at 20:55

## 2021-08-14 RX ADMIN — AMIODARONE HYDROCHLORIDE 300 MG: 200 TABLET ORAL at 08:31

## 2021-08-14 RX ADMIN — AMIODARONE HYDROCHLORIDE 200 MG: 200 TABLET ORAL at 20:53

## 2021-08-14 RX ADMIN — HYDROCODONE BITARTRATE AND ACETAMINOPHEN 2 TABLET: 5; 325 TABLET ORAL at 08:29

## 2021-08-14 RX ADMIN — GABAPENTIN 200 MG: 100 CAPSULE ORAL at 20:53

## 2021-08-14 RX ADMIN — ENOXAPARIN SODIUM 40 MG: 40 INJECTION SUBCUTANEOUS at 17:30

## 2021-08-14 RX ADMIN — METOPROLOL TARTRATE 50 MG: 50 TABLET, FILM COATED ORAL at 20:52

## 2021-08-14 RX ADMIN — METOPROLOL TARTRATE 50 MG: 50 TABLET, FILM COATED ORAL at 08:31

## 2021-08-14 RX ADMIN — GUAIFENESIN 1200 MG: 600 TABLET, EXTENDED RELEASE ORAL at 20:53

## 2021-08-14 RX ADMIN — GUAIFENESIN 1200 MG: 600 TABLET, EXTENDED RELEASE ORAL at 08:31

## 2021-08-14 RX ADMIN — FUROSEMIDE 40 MG: 40 TABLET ORAL at 08:30

## 2021-08-14 RX ADMIN — POLYETHYLENE GLYCOL 3350 17 G: 17 POWDER, FOR SOLUTION ORAL at 08:32

## 2021-08-14 RX ADMIN — POTASSIUM CHLORIDE 20 MEQ: 750 TABLET, EXTENDED RELEASE ORAL at 08:30

## 2021-08-14 RX ADMIN — PANTOPRAZOLE SODIUM 40 MG: 40 TABLET, DELAYED RELEASE ORAL at 08:29

## 2021-08-14 RX ADMIN — DOCUSATE SODIUM 50MG AND SENNOSIDES 8.6MG 2 TABLET: 8.6; 5 TABLET, FILM COATED ORAL at 20:52

## 2021-08-14 RX ADMIN — HYDROCODONE BITARTRATE AND ACETAMINOPHEN 2 TABLET: 5; 325 TABLET ORAL at 13:24

## 2021-08-14 RX ADMIN — Medication 150 MG: at 13:21

## 2021-08-14 RX ADMIN — HYDROCODONE BITARTRATE AND ACETAMINOPHEN 2 TABLET: 5; 325 TABLET ORAL at 17:42

## 2021-08-14 RX ADMIN — ATORVASTATIN CALCIUM 40 MG: 20 TABLET, FILM COATED ORAL at 20:52

## 2021-08-14 RX ADMIN — IPRATROPIUM BROMIDE AND ALBUTEROL SULFATE 3 ML: 2.5; .5 SOLUTION RESPIRATORY (INHALATION) at 08:01

## 2021-08-14 RX ADMIN — IPRATROPIUM BROMIDE AND ALBUTEROL SULFATE 3 ML: 2.5; .5 SOLUTION RESPIRATORY (INHALATION) at 21:12

## 2021-08-14 RX ADMIN — INSULIN LISPRO 2 UNITS: 100 INJECTION, SOLUTION INTRAVENOUS; SUBCUTANEOUS at 17:30

## 2021-08-14 RX ADMIN — ACETYLCYSTEINE 3 ML: 200 SOLUTION ORAL; RESPIRATORY (INHALATION) at 21:12

## 2021-08-14 RX ADMIN — SODIUM CHLORIDE, PRESERVATIVE FREE 10 ML: 5 INJECTION INTRAVENOUS at 08:34

## 2021-08-14 RX ADMIN — ASPIRIN 81 MG: 81 TABLET, COATED ORAL at 08:31

## 2021-08-14 RX ADMIN — MUPIROCIN 1 APPLICATION: 20 OINTMENT TOPICAL at 20:51

## 2021-08-14 RX ADMIN — MUPIROCIN 1 APPLICATION: 20 OINTMENT TOPICAL at 08:32

## 2021-08-14 RX ADMIN — ACETYLCYSTEINE 3 ML: 200 SOLUTION ORAL; RESPIRATORY (INHALATION) at 08:01

## 2021-08-14 RX ADMIN — CHLORHEXIDINE GLUCONATE 15 ML: 1.2 RINSE ORAL at 20:55

## 2021-08-14 RX ADMIN — HYDROCODONE BITARTRATE AND ACETAMINOPHEN 2 TABLET: 5; 325 TABLET ORAL at 02:16

## 2021-08-14 RX ADMIN — GABAPENTIN 200 MG: 100 CAPSULE ORAL at 08:30

## 2021-08-14 RX ADMIN — CHLORHEXIDINE GLUCONATE 15 ML: 1.2 RINSE ORAL at 08:33

## 2021-08-14 NOTE — THERAPY TREATMENT NOTE
Patient Name: Marcell Gunn  : 1965    MRN: [id number]                              Today's Date: 2021       Admit Date: 2021    Visit Dx:     ICD-10-CM ICD-9-CM   1. S/P CABG (coronary artery bypass graft)  Z95.1 V45.81     Patient Active Problem List   Diagnosis   • Coronary artery disease   • Chest pain   • CAD (coronary artery disease)     Past Medical History:   Diagnosis Date   • Coronary artery disease    • Hyperlipidemia    • Hypertension      Past Surgical History:   Procedure Laterality Date   • CARDIAC CATHETERIZATION Left 2021    Procedure: Left Cardiac Catheterization;  Surgeon: Calderon Estes MD;  [facility];  Service: Cardiovascular;  Laterality: Left;   • CORONARY ARTERY BYPASS GRAFT N/A 8/10/2021    Procedure: MEDIAN STERNOTOMY, CORONARY ARTERY BYPASS GRAFTING X  8  UTILIZING LEFT INTERNAL MAMMARY ARTERY AND LEFT ENDOSCOPICALLY HARVESTED SAPHENOUS VEIN, PRP;  Surgeon: Jr Robbin Patten MD;  Location: [facility];  Service: Cardiothoracic;  Laterality: N/A;   • TRANSESOPHAGEAL ECHOCARDIOGRAM (JABIER) N/A 8/10/2021    Procedure: TRANSESOPHAGEAL ECHOCARDIOGRAM WITH ANESTHESIA;  Surgeon: Jr Robbin Patten MD;  Location: [facility];  Service: Cardiothoracic;  Laterality: N/A;     General Information     Row Name 21          Physical Therapy Time and Intention    Document Type  therapy note (daily note)  -     Mode of Treatment  physical therapy  -     Row Name 21          General Information    Existing Precautions/Restrictions  fall;oxygen therapy device and L/min;sternal  -     Row Name 21          Cognition    Orientation Status (Cognition)  oriented x 4  -       User Key  (r) = Recorded By, (t) = Taken By, (c) = Cosigned By    Initials Name Provider Type     Candace Angel PTA Physical Therapy Assistant        Mobility     Row Name 21          Bed Mobility    Comment (Bed  Mobility)  up in chair  -[facility] Name 08/14/21 0927          Sit-Stand Transfer    Sit-Stand [place] (Transfers)  supervision  -[facility] Name 08/14/21 0927          Gait/Stairs (Locomotion)    [place] Level (Gait)  standby assist  -     Distance in Feet (Gait)  200  -SM     Deviations/Abnormal Patterns (Gait)  ramiro decreased;stride length decreased  -     [place] Level (Stairs)  stand by assist  -     Handrail Location (Stairs)  right side (ascending)  -     Number of Steps (Stairs)  9  -SM     Ascending Technique (Stairs)  step-over-step  -SM     Descending Technique (Stairs)  step-over-step  -SM     Comment (Gait/Stairs)  standing rest break needed after stairs d/t SOA and fatigue  -       User Key  (r) = Recorded By, (t) = Taken By, (c) = Cosigned By    Initials Name Provider Type    Candace Borrero PTA Physical Therapy Assistant        Obj/Interventions     [facility] Name 08/14/21 0928          Motor Skills    Therapeutic Exercise  -- reviewed cardiac protocol and encouraged pt to perform later  -       User Key  (r) = Recorded By, (t) = Taken By, (c) = Cosigned By    Initials Name Provider Type    Candace Borrero PTA Physical Therapy Assistant        Goals/Plan    No documentation.       Clinical Impression     [facility] Name 08/14/21 0928          Pain    Additional Documentation  Pain Scale: Numbers Pre/Post-Treatment (Group)  -SM     Row Name 08/14/21 0928          Pain Scale: Numbers Pre/Post-Treatment    Pretreatment Pain Rating  0/10 - no pain  -     Posttreatment Pain Rating  2/10  -SM     Pain Location - Orientation  incisional  -     Pain Location  chest  -     Pain Intervention(s)  Repositioned;Ambulation/increased activity;Rest  -[facility] Name 08/14/21 0928          Positioning and Restraints    Pre-Treatment Position  sitting in chair/recliner  -     Post Treatment Position  chair  -SM     In Chair  reclined;call light within reach;encouraged to call  for assist;with family/caregiver  -       User Key  (r) = Recorded By, (t) = Taken By, (c) = Cosigned By    Initials Name Provider Type    Candace Borrero PTA Physical Therapy Assistant        Outcome Measures     Row Name 08/14/21 0929          How much help from another person do you currently need...    Turning from your back to your side while in flat bed without using bedrails?  3  -SM     Moving from lying on back to sitting on the side of a flat bed without bedrails?  3  -SM     Moving to and from a bed to a chair (including a wheelchair)?  4  -SM     Standing up from a chair using your arms (e.g., wheelchair, bedside chair)?  4  -SM     Climbing 3-5 steps with a railing?  3  -SM     To walk in hospital room?  3  -SM     AM-PAC 6 Clicks Score (PT)  20  -     Row Name 08/14/21 0929          Functional Assessment    Outcome Measure Options  AM-PAC 6 Clicks Basic Mobility (PT)  -       User Key  (r) = Recorded By, (t) = Taken By, (c) = Cosigned By    Initials Name Provider Type    Candace Borrero PTA Physical Therapy Assistant                       Physical Therapy Education                 Title: PT OT SLP Therapies (Done)     Topic: Physical Therapy (Done)     Point: Mobility training (Done)     Learning Progress Summary           Patient Acceptance, E,TB,D, VU by  at 8/14/2021 0929                   Point: Home exercise program (Done)     Learning Progress Summary           Patient Acceptance, E,TB,D, VU by  at 8/14/2021 0929                   Point: Body mechanics (Done)     Learning Progress Summary           Patient Acceptance, E,TB,D, VU by  at 8/14/2021 0929                   Point: Precautions (Done)     Learning Progress Summary           Patient Acceptance, E,TB,D, VU by  at 8/14/2021 0929    Acceptance, E, VU by ME at 8/13/2021 1013    Acceptance, E, VU by ME at 8/12/2021 1122    Acceptance, E, VU by ME at 8/11/2021 0915                               User Key      Initials Effective Dates Name Provider Type Discipline     03/07/18 -  Candace Angel PTA Physical Therapy Assistant PT    ME 07/01/21 -  Desmond Barahona, PT Student PT Student PT              PT Recommendation and Plan     Plan of Care Reviewed With: patient  Progress: improving  Outcome Summary: Pt tolerated treatment well this date. Ambulated 200ft w/ SBA only, then completed stairs w/ use of 1 hand rail and SBA. Pt required standing rest break after completing stairs d/t SOA and fatigue. All goals met, and PT will sign off at this time. Encouraged pt to perform exercises on own and ambulate w/ nsg during the day.     Time Calculation:   PT Charges     Row Name 08/14/21 0932             Time Calculation    Start Time  0854  -      Stop Time  0905  -      Time Calculation (min)  11 min  -      PT Received On  08/14/21  -        User Key  (r) = Recorded By, (t) = Taken By, (c) = Cosigned By    Initials Name Provider Type     Candace Angel PTA Physical Therapy Assistant        Therapy Charges for Today     Code Description Service Date Service Provider Modifiers Qty    69164727001 HC PT THER PROC EA 15 MIN 8/14/2021 Candace Angel PTA GP 1          PT G-Codes  Outcome Measure Options: AM-PAC 6 Clicks Basic Mobility (PT)  AM-PAC 6 Clicks Score (PT): 20    Candace Angel PTA  8/14/2021

## 2021-08-14 NOTE — PROGRESS NOTES
LOS: 5 days   Patient Care Team:  Alan Prince DO as PCP - General (Family Medicine)    Chief Complaint: post op    Subjective:  Symptoms:  He reports cough.  No shortness of breath or chest pain.    Diet:  Adequate intake.  No nausea or vomiting.    Activity level: Returning to normal.    Pain:  He complains of pain that is mild.  Pain is well controlled and requiring pain medication.      Vital Signs  Temp:  [97.8 °F (36.6 °C)-99.2 °F (37.3 °C)] 97.8 °F (36.6 °C)  Heart Rate:  [82-99] 99  Resp:  [16-18] 18  BP: (105-127)/(64-77) 124/74  Body mass index is 38.36 kg/m².    Intake/Output Summary (Last 24 hours) at 8/14/2021 0847  Last data filed at 8/14/2021 0824  Gross per 24 hour   Intake 1100 ml   Output 880 ml   Net 220 ml     I/O this shift:  In: 360 [P.O.:360]  Out: -     Chest tube drainage last 8 hours: 25        08/12/21  0715 08/13/21  0545 08/14/21  0335   Weight: 111 kg (244 lb) 110 kg (243 lb 9.6 oz) 111 kg (244 lb 14.4 oz)     Objective:  General Appearance:  Comfortable and in no acute distress.    Vital signs: (most recent): Blood pressure 124/74, pulse 99, temperature 97.8 °F (36.6 °C), temperature source Oral, resp. rate 18, weight 111 kg (244 lb 14.4 oz), SpO2 94 %.  Vital signs are normal.  No fever.    Output: Producing urine.    Lungs:  Normal effort and normal respiratory rate.  There are rales and decreased breath sounds.    Heart: Tachycardia.  Regular rhythm.  (ST on tele monitor)  Abdomen: Abdomen is soft.  Bowel sounds are normal.     Extremities: There is dependent edema (mild bilateral LE).    Pulses: Distal pulses are intact.    Neurological: Patient is alert and oriented to person, place and time.    Skin:  Warm and dry.  (Sternal dressing clean, dry, and intact)        Results Review:        WBC WBC   Date Value Ref Range Status   08/14/2021 12.94 (H) 3.40 - 10.80 10*3/mm3 Final   08/13/2021 16.75 (H) 3.40 - 10.80 10*3/mm3 Final   08/12/2021 17.45 (H) 3.40 - 10.80 10*3/mm3  Final      HGB Hemoglobin   Date Value Ref Range Status   08/14/2021 8.0 (L) 13.0 - 17.7 g/dL Final   08/13/2021 8.8 (L) 13.0 - 17.7 g/dL Final   08/12/2021 9.2 (L) 13.0 - 17.7 g/dL Final      HCT Hematocrit   Date Value Ref Range Status   08/14/2021 24.1 (L) 37.5 - 51.0 % Final   08/13/2021 26.5 (L) 37.5 - 51.0 % Final   08/12/2021 26.6 (L) 37.5 - 51.0 % Final      Platelets Platelets   Date Value Ref Range Status   08/14/2021 189 140 - 450 10*3/mm3 Final   08/13/2021 182 140 - 450 10*3/mm3 Final   08/12/2021 168 140 - 450 10*3/mm3 Final        PT/INR:    No results found for: PROTIME/  No results found for: INR    Sodium Sodium   Date Value Ref Range Status   08/14/2021 136 136 - 145 mmol/L Final   08/13/2021 135 (L) 136 - 145 mmol/L Final   08/12/2021 136 136 - 145 mmol/L Final      Potassium Potassium   Date Value Ref Range Status   08/14/2021 3.8 3.5 - 5.2 mmol/L Final   08/13/2021 3.9 3.5 - 5.2 mmol/L Final     Comment:     Slight hemolysis detected by analyzer. Results may be affected.   08/12/2021 3.8 3.5 - 5.2 mmol/L Final      Chloride Chloride   Date Value Ref Range Status   08/14/2021 101 98 - 107 mmol/L Final   08/13/2021 100 98 - 107 mmol/L Final   08/12/2021 99 98 - 107 mmol/L Final      Bicarbonate CO2   Date Value Ref Range Status   08/14/2021 23.1 22.0 - 29.0 mmol/L Final   08/13/2021 23.4 22.0 - 29.0 mmol/L Final   08/12/2021 23.4 22.0 - 29.0 mmol/L Final      BUN BUN   Date Value Ref Range Status   08/14/2021 22 (H) 6 - 20 mg/dL Final   08/13/2021 25 (H) 6 - 20 mg/dL Final   08/12/2021 21 (H) 6 - 20 mg/dL Final      Creatinine Creatinine   Date Value Ref Range Status   08/14/2021 0.68 (L) 0.76 - 1.27 mg/dL Final   08/13/2021 0.88 0.76 - 1.27 mg/dL Final   08/12/2021 0.86 0.76 - 1.27 mg/dL Final      Calcium Calcium   Date Value Ref Range Status   08/14/2021 8.2 (L) 8.6 - 10.5 mg/dL Final   08/13/2021 8.5 (L) 8.6 - 10.5 mg/dL Final   08/12/2021 8.5 (L) 8.6 - 10.5 mg/dL Final      Magnesium No  results found for: MG       acetylcysteine, 3 mL, Nebulization, BID - RT  amiodarone, 300 mg, Oral, Q12H  aspirin, 81 mg, Oral, Daily  atorvastatin, 40 mg, Oral, Nightly  chlorhexidine, 15 mL, Mouth/Throat, Q12H  enoxaparin, 40 mg, Subcutaneous, Daily  furosemide, 40 mg, Oral, Daily  gabapentin, 200 mg, Oral, Q12H  guaiFENesin, 1,200 mg, Oral, Q12H  insulin lispro, 0-9 Units, Subcutaneous, 4x Daily With Meals & Nightly  metoprolol tartrate, 50 mg, Oral, Q12H  mupirocin, , Each Nare, BID  pantoprazole, 40 mg, Oral, QAM  polyethylene glycol, 17 g, Oral, Daily  potassium chloride, 20 mEq, Oral, Daily  senna-docusate sodium, 2 tablet, Oral, Nightly  sodium chloride, 10 mL, Intravenous, Q12H      sodium chloride, 30 mL/hr, Last Rate: 30 mL/hr (08/10/21 1205)  sodium chloride, 30 mL/hr, Last Rate: 30 mL/hr (08/10/21 1205)      Patient Active Problem List   Diagnosis Code   • Coronary artery disease I25.10   • Chest pain R07.9   • CAD (coronary artery disease) I25.10       Assessment & Plan   - severe multivessel CAD-- s/p CABGx8 LIMA/LSVG- POD#4 [place]  - hypertension--stable  - hyperlipidemia--statin therapy  - expected blood loss anemia  - leukocytosis--probably reactive; trending down     Looks good this morning  Mobilize/aggressive pulmonary toilet-- continue add flutter valve/nebs  On 1L NC--wean as able; will check overnight tonight. May need walking oximetry prior to d/c if unable to wean  Discontinue remaining chest tube  Hgb down to 8 this morning. Patient is asymptomatic. Will add iron supplement and recheck CBC in am  Possible discharge home with home health in the next 1-2 days if progress continues  Continue routine care    EFREN Byers  08/14/21  08:47 EDT

## 2021-08-14 NOTE — PLAN OF CARE
Goal Outcome Evaluation:  Plan of Care Reviewed With: patient        Progress: improving  Outcome Summary: Pt tolerated treatment well this date. Ambulated 200ft w/ SBA only, then completed stairs w/ use of 1 hand rail and SBA. Pt required standing rest break after completing stairs d/t SOA and fatigue. All goals met, and PT will sign off at this time. Encouraged pt to perform exercises on own and ambulate w/ nsg during the day.

## 2021-08-15 VITALS
SYSTOLIC BLOOD PRESSURE: 137 MMHG | OXYGEN SATURATION: 92 % | HEART RATE: 87 BPM | WEIGHT: 244.2 LBS | DIASTOLIC BLOOD PRESSURE: 78 MMHG | TEMPERATURE: 98.5 F | RESPIRATION RATE: 18 BRPM | BODY MASS INDEX: 38.25 KG/M2

## 2021-08-15 LAB
ANION GAP SERPL CALCULATED.3IONS-SCNC: 12.3 MMOL/L (ref 5–15)
BUN SERPL-MCNC: 17 MG/DL (ref 6–20)
BUN/CREAT SERPL: 21.3 (ref 7–25)
CALCIUM SPEC-SCNC: 8.1 MG/DL (ref 8.6–10.5)
CHLORIDE SERPL-SCNC: 102 MMOL/L (ref 98–107)
CO2 SERPL-SCNC: 22.7 MMOL/L (ref 22–29)
CREAT SERPL-MCNC: 0.8 MG/DL (ref 0.76–1.27)
DEPRECATED RDW RBC AUTO: 48.1 FL (ref 37–54)
ERYTHROCYTE [DISTWIDTH] IN BLOOD BY AUTOMATED COUNT: 13 % (ref 12.3–15.4)
GFR SERPL CREATININE-BSD FRML MDRD: 101 ML/MIN/1.73
GLUCOSE BLDC GLUCOMTR-MCNC: 114 MG/DL (ref 70–130)
GLUCOSE SERPL-MCNC: 165 MG/DL (ref 65–99)
HCT VFR BLD AUTO: 23.7 % (ref 37.5–51)
HGB BLD-MCNC: 7.9 G/DL (ref 13–17.7)
MCH RBC QN AUTO: 33.9 PG (ref 26.6–33)
MCHC RBC AUTO-ENTMCNC: 33.3 G/DL (ref 31.5–35.7)
MCV RBC AUTO: 101.7 FL (ref 79–97)
PLATELET # BLD AUTO: 260 10*3/MM3 (ref 140–450)
PMV BLD AUTO: 9.9 FL (ref 6–12)
POTASSIUM SERPL-SCNC: 3.8 MMOL/L (ref 3.5–5.2)
RBC # BLD AUTO: 2.33 10*6/MM3 (ref 4.14–5.8)
SODIUM SERPL-SCNC: 137 MMOL/L (ref 136–145)
WBC # BLD AUTO: 11.44 10*3/MM3 (ref 3.4–10.8)

## 2021-08-15 PROCEDURE — 82962 GLUCOSE BLOOD TEST: CPT

## 2021-08-15 PROCEDURE — 85027 COMPLETE CBC AUTOMATED: CPT | Performed by: NURSE PRACTITIONER

## 2021-08-15 PROCEDURE — 99024 POSTOP FOLLOW-UP VISIT: CPT | Performed by: THORACIC SURGERY (CARDIOTHORACIC VASCULAR SURGERY)

## 2021-08-15 PROCEDURE — 80048 BASIC METABOLIC PNL TOTAL CA: CPT | Performed by: NURSE PRACTITIONER

## 2021-08-15 PROCEDURE — 94618 PULMONARY STRESS TESTING: CPT

## 2021-08-15 RX ORDER — METOPROLOL TARTRATE 50 MG/1
50 TABLET, FILM COATED ORAL EVERY 12 HOURS SCHEDULED
Qty: 60 TABLET | Refills: 2 | Status: SHIPPED | OUTPATIENT
Start: 2021-08-15 | End: 2021-09-22

## 2021-08-15 RX ORDER — ASPIRIN 81 MG/1
81 TABLET ORAL DAILY
Qty: 30 TABLET | Refills: 2 | Status: SHIPPED | OUTPATIENT
Start: 2021-08-15 | End: 2021-11-13

## 2021-08-15 RX ORDER — POTASSIUM CHLORIDE 20 MEQ/1
20 TABLET, EXTENDED RELEASE ORAL DAILY
Qty: 30 TABLET | Refills: 0 | Status: SHIPPED | OUTPATIENT
Start: 2021-08-15 | End: 2021-09-14

## 2021-08-15 RX ORDER — FUROSEMIDE 40 MG/1
40 TABLET ORAL DAILY
Qty: 30 TABLET | Refills: 0 | Status: SHIPPED | OUTPATIENT
Start: 2021-08-15 | End: 2021-09-22

## 2021-08-15 RX ORDER — IRON POLYSACCHARIDE COMPLEX 150 MG
150 CAPSULE ORAL DAILY
Qty: 30 CAPSULE | Refills: 0 | Status: SHIPPED | OUTPATIENT
Start: 2021-08-15 | End: 2021-09-14

## 2021-08-15 RX ORDER — HYDROCODONE BITARTRATE AND ACETAMINOPHEN 5; 325 MG/1; MG/1
1 TABLET ORAL EVERY 4 HOURS PRN
Qty: 42 TABLET | Refills: 0 | Status: SHIPPED | OUTPATIENT
Start: 2021-08-15 | End: 2021-08-22

## 2021-08-15 RX ORDER — CYCLOBENZAPRINE HCL 10 MG
10 TABLET ORAL EVERY 8 HOURS PRN
Qty: 30 TABLET | Refills: 0 | Status: SHIPPED | OUTPATIENT
Start: 2021-08-15 | End: 2021-12-13

## 2021-08-15 RX ORDER — GUAIFENESIN 600 MG/1
1200 TABLET, EXTENDED RELEASE ORAL EVERY 12 HOURS SCHEDULED
Qty: 28 TABLET | Refills: 0 | Status: SHIPPED | OUTPATIENT
Start: 2021-08-15 | End: 2021-08-22

## 2021-08-15 RX ADMIN — MUPIROCIN 1 APPLICATION: 20 OINTMENT TOPICAL at 09:36

## 2021-08-15 RX ADMIN — PANTOPRAZOLE SODIUM 40 MG: 40 TABLET, DELAYED RELEASE ORAL at 06:23

## 2021-08-15 RX ADMIN — HYDROCODONE BITARTRATE AND ACETAMINOPHEN 2 TABLET: 5; 325 TABLET ORAL at 01:28

## 2021-08-15 RX ADMIN — GABAPENTIN 200 MG: 100 CAPSULE ORAL at 09:36

## 2021-08-15 RX ADMIN — POTASSIUM CHLORIDE 20 MEQ: 750 TABLET, EXTENDED RELEASE ORAL at 09:36

## 2021-08-15 RX ADMIN — GUAIFENESIN 1200 MG: 600 TABLET, EXTENDED RELEASE ORAL at 09:36

## 2021-08-15 RX ADMIN — POLYETHYLENE GLYCOL 3350 17 G: 17 POWDER, FOR SOLUTION ORAL at 09:39

## 2021-08-15 RX ADMIN — FUROSEMIDE 40 MG: 40 TABLET ORAL at 09:37

## 2021-08-15 RX ADMIN — AMIODARONE HYDROCHLORIDE 200 MG: 200 TABLET ORAL at 09:36

## 2021-08-15 RX ADMIN — METOPROLOL TARTRATE 50 MG: 50 TABLET, FILM COATED ORAL at 09:36

## 2021-08-15 RX ADMIN — CHLORHEXIDINE GLUCONATE 15 ML: 1.2 RINSE ORAL at 09:37

## 2021-08-15 RX ADMIN — ASPIRIN 81 MG: 81 TABLET, COATED ORAL at 09:39

## 2021-08-15 RX ADMIN — HYDROCODONE BITARTRATE AND ACETAMINOPHEN 2 TABLET: 5; 325 TABLET ORAL at 09:35

## 2021-08-15 RX ADMIN — Medication 150 MG: at 09:36

## 2021-08-15 NOTE — DISCHARGE SUMMARY
Date of Admission: 8/9/2021  Date of Discharge:  8/15/2021    Discharge Diagnosis:   - severe multivessel CAD-- s/p CABGx8 LIMA/LSVG  - hypertension--stable  - hyperlipidemia--statin therapy  - expected blood loss anemia  - leukocytosis--probably reactive; trending down    Presenting Problem/History of Present Illness  CAD (coronary artery disease) [I25.10]     Hospital Course  Patient is a 55 y.o. male presented to our office for cardiac surgery evaluation. At that time  recommended surgery. He was admitted to the hospital for further work up, and on 8/10/21 he underwent CABGx8 utilizing VOGT/LSVG with Dr. Patten (see op note for full report). Post-operatively he did well. He was extubated on day of surgery, weaned from pressors, and transferred to stepdown on POD#1. His chest tubes, alvarado catheter, central line and epicardial wires were all removed in the usual fashion without issue. He was weaned from oxygen during the day, but does require oxygen with sleep. An overnight oximetry was completed which showed 1 hour and 28 minutes of desaturation below 89%. He will need nocturnal oxygen at discharge. He should follow up with his PCP and undergo formal sleep study. He has had anemia following surgery secondary to acute blood loss. He was started on iron supplementation. Will recheck CBC in 5 days per home health.  He is tolerating the current medication therapy, and has met PT goals. He is eating and drinking sufficiently, and is urinating and defecating without issue. On POD#5 he was deemed ready for discharge home with home health. Sternal precautions reviewed, as were signs and symptoms of sternal wound infection. He is to follow up with his PCP in 1 week, Dr. Beasley in 2 weeks, and in our office in 1 month.     Procedures Performed  Procedure(s):  MEDIAN STERNOTOMY, CORONARY ARTERY BYPASS GRAFTING X  8  UTILIZING LEFT INTERNAL MAMMARY ARTERY AND LEFT ENDOSCOPICALLY HARVESTED SAPHENOUS VEIN,  PRP  TRANSESOPHAGEAL ECHOCARDIOGRAM WITH ANESTHESIA       Consults:   Consults     No orders found from 7/11/2021 to 8/10/2021.          Pertinent Test Results:    Lab Results   Component Value Date    WBC 11.44 (H) 08/15/2021    HGB 7.9 (L) 08/15/2021    HCT 23.7 (L) 08/15/2021    .7 (H) 08/15/2021     08/15/2021      Lab Results   Component Value Date    GLUCOSE 165 (H) 08/15/2021    CALCIUM 8.1 (L) 08/15/2021     08/15/2021    K 3.8 08/15/2021    CO2 22.7 08/15/2021     08/15/2021    BUN 17 08/15/2021    CREATININE 0.80 08/15/2021    EGFRIFNONA 101 08/15/2021    BCR 21.3 08/15/2021    ANIONGAP 12.3 08/15/2021     Lab Results   Component Value Date    INR 1.31 (H) 08/11/2021    PROTIME 16.1 (H) 08/11/2021         Condition on Discharge:  Stable    Vital Signs  Temp:  [97.5 °F (36.4 °C)-98.5 °F (36.9 °C)] 98.5 °F (36.9 °C)  Heart Rate:  [77-90] 87  Resp:  [18] 18  BP: (110-137)/(69-78) 137/78      Discharge Disposition  Home-Health Care [facility]    Discharge Medications     Discharge Medications      New Medications      Instructions Start Date   aspirin 81 MG EC tablet  Replaces: aspirin 325 MG tablet   81 mg, Oral, Daily      cyclobenzaprine 10 MG tablet  Commonly known as: FLEXERIL   10 mg, Oral, Every 8 Hours PRN      furosemide 40 MG tablet  Commonly known as: LASIX   40 mg, Oral, Daily      guaiFENesin 600 MG 12 hr tablet  Commonly known as: MUCINEX   1,200 mg, Oral, Every 12 Hours Scheduled      HYDROcodone-acetaminophen 5-325 MG per tablet  Commonly known as: NORCO   1 tablet, Oral, Every 4 Hours PRN      iron polysaccharides 150 MG capsule  Commonly known as: NIFEREX   150 mg, Oral, Daily      metoprolol tartrate 50 MG tablet  Commonly known as: LOPRESSOR   50 mg, Oral, Every 12 Hours Scheduled      potassium chloride 20 MEQ CR tablet  Commonly known as: K-DUR,KLOR-CON   20 mEq, Oral, Daily         Continue These Medications      Instructions Start Date   atorvastatin 20 MG  tablet  Commonly known as: LIPITOR   20 mg, Oral, Nightly      multivitamin with minerals tablet tablet   1 tablet, Oral, Daily      Vitamin D3 25 MCG (1000 UT) capsule   1,000 Units, Oral, Daily      Zinc 50 MG capsule   1 tablet, Oral, Daily         Stop These Medications    amLODIPine 5 MG tablet  Commonly known as: NORVASC     aspirin 325 MG tablet  Replaced by: aspirin 81 MG EC tablet     carvedilol 12.5 MG tablet  Commonly known as: COREG     hydroCHLOROthiazide 12.5 MG tablet  Commonly known as: HYDRODIURIL     lisinopril 20 MG tablet  Commonly known as: PRINIVILZESTRIL            Discharge Diet:     Activity at Discharge:    1. No driving for 2 weeks and off narcotic pain medications.  2. Shower daily. Clean incisions with warm water and antibacterial soap only. Do not put any lotion or ointments on incisions.  3. Ambulate for 10 minutes at least 3 times a day.  4. No heavy lifting > 10lbs until seen in office.   5. Take all medications as prescribed.     Follow-up Appointments  Future Appointments   Date Time Provider Department Center   8/16/2021 12:00 PM Calderon Estes MD [facility]   1/31/2022  1:00 PM Calderon Estes MD [facility]     Additional Instructions for the Follow-ups that You Need to Schedule     Ambulatory Referral to Cardiac Rehab   As directed      Ambulatory Referral to Home Health   As directed      To have CBC drawn on Friday 8/20/21 and called to [phone]    Order Comments: To have CBC drawn on Friday 8/20/21 and called to [phone]     Face to Face Visit Date: 8/15/2021    Follow-up provider for Plan of Care?: I will be treating the patient on an ongoing basis.  Please send me the Plan of Care for signature.    Follow-up provider: JR SHAHRZAD WINKLER [[id number]]    Reason/Clinical Findings: post op open heart    Describe mobility limitations that make leaving home difficult: post-operative weakness    Nursing/Therapeutic Services Requested: Skilled Nursing     Skilled nursing orders: Post CABG care    Frequency: 1 Week 1         Call MD With Problems / Concerns   As directed      Instructions:  Call office at [phone] for any drainage, increased redness, or fever over 100.5    Order Comments: Instructions:  Call office at [phone] for any drainage, increased redness, or fever over 100.5          Discharge Follow-up with PCP   As directed       Currently Documented PCP:    Alan Prince DO    PCP Phone Number:    [phone]     Follow Up Details: in 1 week         Discharge Follow-up with Specified Provider: Cardiologist--Dr. Beasley; 2 Weeks   As directed      To: Cardiologist--Dr. Beasley    Follow Up: 2 Weeks    Follow Up Details: call for appointment, bring all medication bottles to appointment         Discharge Follow-up with Specified Provider: Dr. Patten's APRN; 1 Month   As directed      To: Dr. Steve SCHWARTZN    Follow Up: 1 Month    Follow Up Details: call for appointment; bring all current medications to appointment         CBC & Differential    Aug 20, 2021 (Approximate)      Manual Differential: No    Release to patient: Immediate               Test Results Pending at Discharge       EFREN Byers  08/15/21  08:46 EDT  .

## 2021-08-15 NOTE — PROGRESS NOTES
Exercise Oximetry    Patient Name:Marcell Gunn   MRN: [id number]   Date: 08/15/21             ROOM AIR BASELINE   SpO2% 93   Heart Rate    Blood Pressure      EXERCISE ON ROOM AIR SpO2% EXERCISE ON O2 @  LPM SpO2%   1 MINUTE 93 1 MINUTE    2 MINUTES 94 2 MINUTES    3 MINUTES 93 3 MINUTES    4 MINUTES 95 4 MINUTES    5 MINUTES 94 5 MINUTES    6 MINUTES 93 6 MINUTES               Distance Walked   Distance Walked   Dyspnea (Charlene Scale)   Dyspnea (Charlene Scale)   Fatigue (Charlene Scale)   Fatigue (Charlene Scale)   SpO2% Post Exercise  93 SpO2% Post Exercise   HR Post Exercise   HR Post Exercise   Time to Recovery   Time to Recovery     Comments:

## 2021-08-15 NOTE — CASE MANAGEMENT/SOCIAL WORK
Continued Stay Note  [facility]     Patient Name: Marcell Gunn  MRN: [id number]  Today's Date: 8/15/2021    Admit Date: 8/9/2021    Discharge Plan     Row Name 08/15/21 1029       Plan    Plan  Home with [facility] and nightime O2 proivded by [facility]    Patient/Family in Agreement with Plan  yes    Plan Comments  Received call from RN stating pt needs home O2 at discharge.  Order noted for nightime O2 and qualifying overnight oximetry.  Spoke with [facility] on call line/[phone] opt1 and notified of need.  Order, facesheet, overnight oximetry, and d/c summary faxed to [facility] at [phone].  Also notified Kirill Best/[facility] [phone].  Pt will need to call [phone] when he arrives home to notify [facility] so they can deliver.  Call  placed to [place] [phone] and notified of pt discharge.  CLAUDIA Hernandes RN        Discharge Codes    No documentation.       Expected Discharge Date and Time     Expected Discharge Date Expected Discharge Time    Aug 15, 2021             Naomie Hernandes, RN

## 2021-08-15 NOTE — PLAN OF CARE
Goal Outcome Evaluation:         discharge instructions reviewed, all questions answered. Pt instructed to call rotect [phone] for oxygen delivery after arriving home. VSS awaiting transport

## 2021-08-16 LAB
ACT BLD: 114 SECONDS (ref 82–152)
ACT BLD: 345 SECONDS (ref 82–152)
ACT BLD: 351 SECONDS (ref 82–152)
ACT BLD: 411 SECONDS (ref 82–152)
ACT BLD: 494 SECONDS (ref 82–152)
ACT BLD: 543 SECONDS (ref 82–152)
ACT BLD: 92 SECONDS (ref 82–152)
BASE EXCESS BLDA CALC-SCNC: 1 MMOL/L (ref -5–5)
BASE EXCESS BLDA CALC-SCNC: 2 MMOL/L (ref -5–5)
BASE EXCESS BLDA CALC-SCNC: 3 MMOL/L (ref -5–5)
BASE EXCESS BLDA CALC-SCNC: 4 MMOL/L (ref -5–5)
CA-I BLDA-SCNC: ABNORMAL MMOL/L
CO2 BLDA-SCNC: 28 MMOL/L (ref 24–29)
CO2 BLDA-SCNC: 29 MMOL/L (ref 24–29)
CO2 BLDA-SCNC: 29 MMOL/L (ref 24–29)
CO2 BLDA-SCNC: 30 MMOL/L (ref 24–29)
CO2 BLDA-SCNC: 31 MMOL/L (ref 24–29)
CO2 BLDA-SCNC: 31 MMOL/L (ref 24–29)
GLUCOSE BLDC GLUCOMTR-MCNC: 142 MG/DL (ref 70–130)
GLUCOSE BLDC GLUCOMTR-MCNC: 151 MG/DL (ref 70–130)
GLUCOSE BLDC GLUCOMTR-MCNC: 158 MG/DL (ref 70–130)
GLUCOSE BLDC GLUCOMTR-MCNC: 159 MG/DL (ref 70–130)
GLUCOSE BLDC GLUCOMTR-MCNC: 168 MG/DL (ref 70–130)
GLUCOSE BLDC GLUCOMTR-MCNC: 170 MG/DL (ref 70–130)
GLUCOSE BLDC GLUCOMTR-MCNC: 175 MG/DL (ref 70–130)
GLUCOSE BLDC GLUCOMTR-MCNC: 176 MG/DL (ref 70–130)
HCO3 BLDA-SCNC: 26.9 MMOL/L (ref 22–26)
HCO3 BLDA-SCNC: 27.1 MMOL/L (ref 22–26)
HCO3 BLDA-SCNC: 27.8 MMOL/L (ref 22–26)
HCO3 BLDA-SCNC: 28.3 MMOL/L (ref 22–26)
HCO3 BLDA-SCNC: 28.4 MMOL/L (ref 22–26)
HCO3 BLDA-SCNC: 28.6 MMOL/L (ref 22–26)
HCO3 BLDA-SCNC: 28.9 MMOL/L (ref 22–26)
HCO3 BLDA-SCNC: 29 MMOL/L (ref 22–26)
HCT VFR BLDA CALC: 28 % (ref 38–51)
HCT VFR BLDA CALC: 28 % (ref 38–51)
HCT VFR BLDA CALC: 29 % (ref 38–51)
HCT VFR BLDA CALC: 30 % (ref 38–51)
HCT VFR BLDA CALC: 31 % (ref 38–51)
HCT VFR BLDA CALC: 31 % (ref 38–51)
HCT VFR BLDA CALC: 32 % (ref 38–51)
HCT VFR BLDA CALC: 34 % (ref 38–51)
HGB BLDA-MCNC: 10.2 G/DL (ref 12–17)
HGB BLDA-MCNC: 10.5 G/DL (ref 12–17)
HGB BLDA-MCNC: 10.5 G/DL (ref 12–17)
HGB BLDA-MCNC: 10.9 G/DL (ref 12–17)
HGB BLDA-MCNC: 11.6 G/DL (ref 12–17)
HGB BLDA-MCNC: 9.5 G/DL (ref 12–17)
HGB BLDA-MCNC: 9.5 G/DL (ref 12–17)
HGB BLDA-MCNC: 9.9 G/DL (ref 12–17)
PCO2 BLDA: 42.3 MM HG (ref 35–45)
PCO2 BLDA: 46.6 MM HG (ref 35–45)
PCO2 BLDA: 47.1 MM HG (ref 35–45)
PCO2 BLDA: 50.4 MM HG (ref 35–45)
PCO2 BLDA: 52.4 MM HG (ref 35–45)
PCO2 BLDA: 53.6 MM HG (ref 35–45)
PCO2 BLDA: 55.4 MM HG (ref 35–45)
PCO2 BLDA: 55.7 MM HG (ref 35–45)
PH BLDA: 7.3 PH UNITS (ref 7.35–7.6)
PH BLDA: 7.32 PH UNITS (ref 7.35–7.6)
PH BLDA: 7.34 PH UNITS (ref 7.35–7.6)
PH BLDA: 7.34 PH UNITS (ref 7.35–7.6)
PH BLDA: 7.36 PH UNITS (ref 7.35–7.6)
PH BLDA: 7.38 PH UNITS (ref 7.35–7.6)
PH BLDA: 7.4 PH UNITS (ref 7.35–7.6)
PH BLDA: 7.41 PH UNITS (ref 7.35–7.6)
PO2 BLDA: 193 MMHG (ref 80–105)
PO2 BLDA: 252 MMHG (ref 80–105)
PO2 BLDA: 388 MMHG (ref 80–105)
PO2 BLDA: 40 MMHG (ref 80–105)
PO2 BLDA: 402 MMHG (ref 80–105)
PO2 BLDA: 44 MMHG (ref 80–105)
PO2 BLDA: 450 MMHG (ref 80–105)
PO2 BLDA: 506 MMHG (ref 80–105)
POTASSIUM BLDA-SCNC: 4 MMOL/L (ref 3.5–4.9)
POTASSIUM BLDA-SCNC: 4.4 MMOL/L (ref 3.5–4.9)
POTASSIUM BLDA-SCNC: 4.4 MMOL/L (ref 3.5–4.9)
POTASSIUM BLDA-SCNC: 4.5 MMOL/L (ref 3.5–4.9)
POTASSIUM BLDA-SCNC: 5.3 MMOL/L (ref 3.5–4.9)
POTASSIUM BLDA-SCNC: 5.3 MMOL/L (ref 3.5–4.9)
POTASSIUM BLDA-SCNC: 5.4 MMOL/L (ref 3.5–4.9)
POTASSIUM BLDA-SCNC: 5.4 MMOL/L (ref 3.5–4.9)
SAO2 % BLDA: 100 % (ref 95–98)
SAO2 % BLDA: 73 % (ref 95–98)
SAO2 % BLDA: 74 % (ref 95–98)

## 2021-08-16 NOTE — CASE MANAGEMENT/SOCIAL WORK
Case Management Discharge Note      Final Note: Pt discharged home 8/15/2021 with [facility] scheduled to follow and [employer] providing nocturnal oxygen…..SS/CCP    Provided Post Acute Provider Quality & Resource List?: Yes  Post Acute Provider Quality and Resource List: Home Health  Delivered To: Support Person  Support Person: Eda Gunn, wife  Method of Delivery: In person    Selected Continued Care - Discharged on 8/15/2021 Admission date: 8/9/2021 - Discharge disposition: Home-Health Care Svc    Destination    No services have been selected for the patient.              Durable Medical Equipment Coordination complete.    Service Provider Selected Services Address Phone Fax Patient Preferred    [facility]  Durable Medical Equipment [address] [facility] [id number] [phone] --          Dialysis/Infusion    No services have been selected for the patient.              Home Medical Care Coordination complete.    Service Provider Selected Services Address Phone Fax Patient Preferred    [facility]  [facility] Health Services [address], [facility] [address] [phone] --          Therapy    No services have been selected for the patient.              Community Resources    No services have been selected for the patient.              Community & DME    No services have been selected for the patient.                       Final Discharge Disposition Code: 06 - home with home health care

## 2021-08-17 ENCOUNTER — TELEPHONE (OUTPATIENT)
Dept: CARDIOLOGY | Facility: CLINIC | Age: 56
End: 2021-08-17

## 2021-08-29 PROBLEM — I50.32 DIASTOLIC CHF, CHRONIC (HCC): Status: ACTIVE | Noted: 2021-08-29

## 2021-08-29 PROBLEM — Z95.1 HX OF CABG: Status: ACTIVE | Noted: 2021-08-29

## 2021-08-29 PROBLEM — E78.2 HYPERLIPEMIA, MIXED: Status: ACTIVE | Noted: 2021-08-29

## 2021-08-29 NOTE — PROGRESS NOTES
[facility]  Cardiology progress Note    Patient Name: Marcell Gunn  : 1965    CHIEF COMPLAINT  Coronary artery disease      Subjective   Subjective     HISTORY OF PRESENT ILLNESS    Marcell Gunn is a 55 y.o. male with history of coronary disease recent CABG.  No chest pain or shortness of breath.    Review of Systems:   Constitutional no fever,  no weight loss   Skin no rash   Otolaryngeal no difficulty swallowing   Cardiovascular See HPI   Pulmonary no cough, no sputum production   Gastrointestinal no constipation, no diarrhea   Genitourinary no dysuria, no hematuria   Hematologic no easy bruisability, no abnormal bleeding   Musculoskeletal no muscle pain   Neurologic no dizziness, no falls         Personal History     Social History:  reports that he has never smoked. He has quit using smokeless tobacco.  His smokeless tobacco use included snuff. He reports current alcohol use. He reports that he does not use drugs.    Home Medications:  Current Outpatient Medications on File Prior to Visit   Medication Sig   • aspirin 81 MG EC tablet Take 1 tablet by mouth Daily for 90 days.   • atorvastatin (LIPITOR) 20 MG tablet Take 1 tablet by mouth Every Night.   • Cholecalciferol (Vitamin D3) 25 MCG (1000 UT) capsule Take 1,000 Units by mouth Daily.   • iron polysaccharides (NIFEREX) 150 MG capsule Take 1 capsule by mouth Daily for 30 days.   • metoprolol tartrate (LOPRESSOR) 50 MG tablet Take 1 tablet by mouth Every 12 (Twelve) Hours for 90 days.   • multivitamin with minerals (MULTIVITAMIN MEN 50+ PO) Take 1 tablet by mouth Daily.   • potassium chloride (K-DUR,KLOR-CON) 20 MEQ CR tablet Take 1 tablet by mouth Daily for 30 days.   • Zinc 50 MG capsule Take 1 tablet by mouth Daily.   • cyclobenzaprine (FLEXERIL) 10 MG tablet Take 1 tablet by mouth Every 8 (Eight) Hours As Needed for Muscle Spasms   • furosemide (LASIX) 40 MG tablet Take 1 tablet by mouth Daily for 30 days.     No current  INTERDISCIPLINARY PLAN OF CARE CONFERENCE    Date/Time: 5/10/2019 3:16 PM  Completed by: Lenora Pike, Case Management      Patient Name:  Lorraine Diaz  YOB: 1958  Admitting Diagnosis: Anasarca [R60.1]     Admit Date/Time:  5/3/2019  1:42 PM    Chart reviewed. Interdisciplinary team met to discuss patient progress and discharge plans. Disciplines included Case Management, Nursing, and Dietitian. Current Status: Inpatient 5/3/2019    PT/OT recommendation:    Anticipated Discharge Date: Saturday  Expected D/C Disposition:  Nursing Home  Confirmed plan with patient: Yes  Discharge Plan Comments: Chart reviewed. Plan: Return to LTC bed at [facility]. [phone]. Old contact number [phone] rang and rang, no answer. Left VM with admissions updating them on plan to DC back to LTC on Saturday.  +eCOC, +CM following    Home O2 in place on admit: No  Pt informed of need to bring portable home O2 tank on day of discharge for nursing to connect prior to leaving:  No  Verbalized agreement/Understanding:  No facility-administered medications on file prior to visit.     Allergies:  No Known Allergies    Objective    Objective       Vitals:   Heart Rate:  [83] 83  BP: (135)/(79) 135/79  Body mass index is 37.28 kg/m².     Physical Exam:   Constitutional: Awake, alert, No acute distress    Eyes: PERRLA, sclerae anicteric, no conjunctival injection   HENT: NCAT, mucous membranes moist   Neck: Supple, no thyromegaly, no lymphadenopathy, trachea midline   Respiratory: Clear to auscultation bilaterally, nonlabored respirations    Cardiovascular: RRR, no murmurs or rubs. Palpable pedal pulses bilaterally   Gastrointestinal: Positive bowel sounds, soft, nontender, nondistended   Musculoskeletal: No bilateral ankle edema, no cyanosis to extremities   Psychiatric: Appropriate affect, cooperative   Neurologic: Oriented x 3, strength symmetric in all extremities, Cranial Nerves grossly intact to confrontation, speech clear   Skin: No rashes.    Result Review    Result Review:  I have personally reviewed the available results from  [x]  Laboratory  [x]  EKG  [x]  Cardiology  [x]  Medications  [x]  Old records  []  Other:   Procedures  Lab Results   Component Value Date    CHOL 166 08/09/2021     Lab Results   Component Value Date    TRIG 210 (H) 08/09/2021     Lab Results   Component Value Date    HDL 45 08/09/2021     Lab Results   Component Value Date    LDL 86 08/09/2021     Lab Results   Component Value Date    VLDL 35 08/09/2021         Impression/Plan:  1.  Coronary disease recent CABG: Continue aspirin 81 mg once a day.  Continue metoprolol.  Reviewed all his reports from [facility].   2.  Hyperlipidemia: Continue Lipitor.  Lipid profile is reviewed.  Check lipid and hepatic profile next visit.  3.  Chronic diastolic heart failure: Low-salt diet fluid restriction advised.  Decrease Lasix to 40 mg on a as needed basis for pedal edema and shortness of breath.  Discontinue Lasix if he has no further shortness of breath  or pedal edema.        Calderon Estes MD   08/30/21   14:06 EDT

## 2021-08-30 ENCOUNTER — OFFICE VISIT (OUTPATIENT)
Dept: CARDIOLOGY | Facility: CLINIC | Age: 56
End: 2021-08-30

## 2021-08-30 VITALS
WEIGHT: 238 LBS | BODY MASS INDEX: 37.35 KG/M2 | SYSTOLIC BLOOD PRESSURE: 135 MMHG | HEART RATE: 83 BPM | HEIGHT: 67 IN | DIASTOLIC BLOOD PRESSURE: 79 MMHG

## 2021-08-30 DIAGNOSIS — Z95.1 HX OF CABG: ICD-10-CM

## 2021-08-30 DIAGNOSIS — I25.10 CORONARY ARTERY DISEASE INVOLVING NATIVE CORONARY ARTERY OF NATIVE HEART WITHOUT ANGINA PECTORIS: Primary | ICD-10-CM

## 2021-08-30 DIAGNOSIS — I50.32 DIASTOLIC CHF, CHRONIC (HCC): ICD-10-CM

## 2021-08-30 DIAGNOSIS — E78.2 HYPERLIPEMIA, MIXED: ICD-10-CM

## 2021-08-30 PROCEDURE — 99214 OFFICE O/P EST MOD 30 MIN: CPT | Performed by: SPECIALIST

## 2021-09-22 ENCOUNTER — OFFICE VISIT (OUTPATIENT)
Dept: CARDIAC SURGERY | Facility: CLINIC | Age: 56
End: 2021-09-22

## 2021-09-22 VITALS
HEIGHT: 67 IN | HEART RATE: 68 BPM | BODY MASS INDEX: 36.88 KG/M2 | TEMPERATURE: 97.3 F | OXYGEN SATURATION: 97 % | WEIGHT: 235 LBS | SYSTOLIC BLOOD PRESSURE: 160 MMHG | DIASTOLIC BLOOD PRESSURE: 89 MMHG | RESPIRATION RATE: 20 BRPM

## 2021-09-22 DIAGNOSIS — Z95.1 HX OF CABG: Primary | ICD-10-CM

## 2021-09-22 PROCEDURE — 99024 POSTOP FOLLOW-UP VISIT: CPT | Performed by: NURSE PRACTITIONER

## 2021-09-22 RX ORDER — POTASSIUM CHLORIDE 20 MEQ/1
20 TABLET, EXTENDED RELEASE ORAL DAILY PRN
Qty: 30 TABLET | Refills: 1 | Status: SHIPPED | OUTPATIENT
Start: 2021-09-22 | End: 2021-12-13

## 2021-09-22 RX ORDER — METOPROLOL TARTRATE 100 MG/1
100 TABLET ORAL 2 TIMES DAILY WITH MEALS
COMMUNITY
Start: 2021-09-15 | End: 2021-12-13 | Stop reason: SDUPTHER

## 2021-09-22 RX ORDER — FUROSEMIDE 40 MG/1
40 TABLET ORAL DAILY PRN
Qty: 30 TABLET | Refills: 1 | Status: SHIPPED | OUTPATIENT
Start: 2021-09-22 | End: 2021-12-13

## 2021-09-22 NOTE — PROGRESS NOTES
"CARDIOVASCULAR SURGERY FOLLOW-UP PROGRESS NOTE  Chief Complaint: Postop follow-up        HPI:   Dear Dr. Prince, Alan Dexter DO and colleagues:    It was nice to see Marcell Gunn in follow up 6 weeks after surgery.  As you know, he is a 55 y.o. male with CAD, Hypertension, hyperlipidemia who underwent CABG x8 with LIMA on 8/10/2021 with Dr. Patten.  He did well postoperatively and continues to do well.  He did require nocturnal oxygen at discharge, he states that he has a sleep study scheduled for October. He comes in today complaining of nothing.  His activity level has been good.  His blood pressure is elevated in the office today, the patient states that his cardiologist recently increased his metoprolol.  From a surgical standpoint, the sternal incision is well approximated without erythema, edema, or drainage.  The sternum is stable to palpation, and the patient denies any popping or clicking with deep inspiration or coughing.      Physical Exam:         /89 (BP Location: Left arm, Patient Position: Sitting, Cuff Size: Adult)   Pulse 68   Temp 97.3 °F (36.3 °C) (Oral)   Resp 20   Ht 170.2 cm (67\")   Wt 107 kg (235 lb)   SpO2 97%   BMI 36.81 kg/m²   Heart:  regular rate and rhythm, S1, S2 normal, no murmur, click, rub or gallop  Lungs:  clear to auscultation bilaterally  Extremities:  no edema  Incision(s):  mid chest healing well, left leg healing well, sternum stable    Assessment/Plan:     S/P CABG. Overall, he is doing well.    No significant post-op complications    Keep incisions clean and dry  OK to drive if not taking narcotic pain medicine  OK to begin cardiac rehab  Follow-up as scheduled with cardiology  Follow-up as scheduled with PCP  Follow-up with CT surgery prn  Lasix and potassium as needed for weight gain of 3 pounds in 24 hours or 5 pounds in 72 hours    Continue lifting restriction of 10 lbs until 6 weeks and 50 lbs until 12 weeks from the date of surgery, no excessive " jarring motions or twisting motions until 12 weeks from the date of surgery    Return to clinic if any signs or symptoms of infection or sternal instability develop       Thank you for allowing me to participate in the care of your patient.    Regards,  EFREN Richmond

## 2021-09-22 NOTE — PATIENT INSTRUCTIONS
Continue lifting restriction of 10 lbs until 6 weeks and 50 lbs until 12 weeks from the date of surgery, no excessive jarring motions or twisting motions until 12 weeks from the date of surgery    Weigh daily.  Take Lasix (furosemide) for weight gain of 3 lbs/24 hours or 5 lbs/72 hours, take potassium pill with every lasix dose.       Health Maintenance Summary     Topic Due On Due Status Completed On    Colorectal Cancer Screening - Colonoscopy Mar 17, 2019 Not Due Mar 17, 2014    IMMUNIZATION - DTaP/Tdap/Td Dec 22, 2021 Not Due Dec 22, 2011    Immunization-Influenza Sep 1, 2017 Due On Oct 31, 2016          Patient is up to date, no discussion needed .    Vaccine Information Statement(s) was given today. This has been reviewed, questions answered, and verbal consent given by Patient for injection(s) and administration of Influenza (Inactivated).

## 2021-09-24 ENCOUNTER — OUTSIDE FACILITY SERVICE (OUTPATIENT)
Dept: CARDIAC SURGERY | Facility: CLINIC | Age: 56
End: 2021-09-24

## 2021-09-24 PROCEDURE — G0180 MD CERTIFICATION HHA PATIENT: HCPCS | Performed by: THORACIC SURGERY (CARDIOTHORACIC VASCULAR SURGERY)

## 2021-12-03 ENCOUNTER — PATIENT MESSAGE (OUTPATIENT)
Dept: CARDIOLOGY | Facility: CLINIC | Age: 56
End: 2021-12-03

## 2021-12-11 NOTE — PROGRESS NOTES
[facility]  Cardiology progress Note    Patient Name: Marcell Gunn  : 1965    CHIEF COMPLAINT  Coronary C s/p CABG.      Subjective   Subjective     HISTORY OF PRESENT ILLNESS    Marcell Gunn is a 56 y.o. male with history of cholecystitis post CABG.  No chest pain or shortness of breath.    Review of Systems:   Constitutional no fever,  no weight loss   Skin no rash   Otolaryngeal no difficulty swallowing   Cardiovascular See HPI   Pulmonary no cough, no sputum production   Gastrointestinal no constipation, no diarrhea   Genitourinary no dysuria, no hematuria   Hematologic no easy bruisability, no abnormal bleeding   Musculoskeletal no muscle pain   Neurologic no dizziness, no falls         Personal History     Social History:  reports that he has never smoked. He has quit using smokeless tobacco.  His smokeless tobacco use included snuff. He reports current alcohol use. He reports that he does not use drugs.    Home Medications:  Current Outpatient Medications on File Prior to Visit   Medication Sig   • atorvastatin (LIPITOR) 20 MG tablet Take 1 tablet by mouth Every Night.   • Cholecalciferol (Vitamin D3) 25 MCG (1000 UT) capsule Take 1,000 Units by mouth Daily.   • metoprolol tartrate (LOPRESSOR) 100 MG tablet Take 100 mg by mouth 2 (Two) Times a Day With Meals.   • multivitamin with minerals (MULTIVITAMIN MEN 50+ PO) Take 1 tablet by mouth Daily.   • potassium chloride (K-DUR,KLOR-CON) 20 MEQ CR tablet Take 1 tablet by mouth Daily As Needed (Take with Lasix (furosemide)). (Patient taking differently: Take 20 mEq by mouth Daily As Needed (Take with Lasix (furosemide)). Ran out of medicine   Need refills)   • Zinc 50 MG capsule Take 1 tablet by mouth Daily.   • [DISCONTINUED] cyclobenzaprine (FLEXERIL) 10 MG tablet Take 1 tablet by mouth Every 8 (Eight) Hours As Needed for Muscle Spasms   • [DISCONTINUED] furosemide (LASIX) 40 MG tablet Take 1 tablet by mouth Daily As Needed (Weight  gain of 3 lbs/24 hours or 5 lbs/72 hours) for up to 30 days. (Patient taking differently: Take 40 mg by mouth Daily As Needed (Weight gain of 3 lbs/24 hours or 5 lbs/72 hours). Ran out of medicine, did not have refill)     No current facility-administered medications on file prior to visit.     Allergies:  No Known Allergies    Objective    Objective       Vitals:   Heart Rate:  [80] 80  BP: (137-144)/(67-73) 137/67  Body mass index is 38.84 kg/m².     Physical Exam:   Constitutional: Awake, alert, No acute distress    Eyes: PERRLA, sclerae anicteric, no conjunctival injection   HENT: NCAT, mucous membranes moist   Neck: Supple, no thyromegaly, no lymphadenopathy, trachea midline   Respiratory: Clear to auscultation bilaterally, nonlabored respirations    Cardiovascular: RRR, no murmurs or rubs. Palpable pedal pulses bilaterally   Musculoskeletal: No bilateral ankle edema, no cyanosis to extremities   Psychiatric: Appropriate affect, cooperative   Neurologic: Oriented x 3, strength symmetric in all extremities, Cranial Nerves grossly intact to confrontation, speech clear   Skin: No rashes.    Result Review    Result Review:  I have personally reviewed the available results from  [x]  Laboratory  [x]  EKG  [x]  Cardiology  [x]  Medications  [x]  Old records  []  Other:     ECG 12 Lead    Date/Time: 12/13/2021 2:06 PM  Performed by: Calderon Estes MD  Authorized by: Calderon Estes MD   Comparison: compared with previous ECG   Similar to previous ECG  Rhythm: sinus rhythm  Conduction: left bundle branch block    Clinical impression: abnormal EKG  Comments: Normal sinus rhythm.  No significant changes compared to previous EKG.          Lab Results   Component Value Date    CHOL 166 08/09/2021     Lab Results   Component Value Date    TRIG 210 (H) 08/09/2021     Lab Results   Component Value Date    HDL 45 08/09/2021     Lab Results   Component Value Date    LDL 86 08/09/2021     Lab Results   Component Value  Date    VLDL 35 08/09/2021        Impression/Plan:  1.  Coronary artery status post recent CABG stable: Continue aspirin 81 mg once a day.  Continue metoprolol 100 mg twice daily.  No chest pain.  2.  Hyperlipidemia: Continue Lipitor 20 mg a day.  Lipid profile reviewed which shows an LDL of 86.  Repeat lipid and hepatic profile in 3 months.  3.  Chronic diastolic heart failure stable: Decrease Lasix to 20 mg every other day.  Low-salt diet and fluid restriction advised.           Calderon Estes MD   12/13/21   13:59 EST

## 2021-12-13 ENCOUNTER — OFFICE VISIT (OUTPATIENT)
Dept: CARDIOLOGY | Facility: CLINIC | Age: 56
End: 2021-12-13

## 2021-12-13 VITALS
SYSTOLIC BLOOD PRESSURE: 137 MMHG | DIASTOLIC BLOOD PRESSURE: 67 MMHG | WEIGHT: 248 LBS | BODY MASS INDEX: 38.92 KG/M2 | HEART RATE: 80 BPM | HEIGHT: 67 IN

## 2021-12-13 DIAGNOSIS — I50.32 DIASTOLIC CHF, CHRONIC (HCC): ICD-10-CM

## 2021-12-13 DIAGNOSIS — E78.2 HYPERLIPEMIA, MIXED: ICD-10-CM

## 2021-12-13 DIAGNOSIS — Z95.1 HX OF CABG: ICD-10-CM

## 2021-12-13 DIAGNOSIS — I25.10 CORONARY ARTERY DISEASE INVOLVING NATIVE CORONARY ARTERY OF NATIVE HEART WITHOUT ANGINA PECTORIS: Primary | ICD-10-CM

## 2021-12-13 PROCEDURE — 93000 ELECTROCARDIOGRAM COMPLETE: CPT | Performed by: SPECIALIST

## 2021-12-13 PROCEDURE — 99214 OFFICE O/P EST MOD 30 MIN: CPT | Performed by: SPECIALIST

## 2021-12-13 RX ORDER — ASPIRIN 81 MG/1
81 TABLET ORAL DAILY
Qty: 90 TABLET | Refills: 5 | Status: SHIPPED | OUTPATIENT
Start: 2021-12-13

## 2021-12-13 RX ORDER — FUROSEMIDE 20 MG/1
20 TABLET ORAL EVERY OTHER DAY
Qty: 90 TABLET | Refills: 3 | Status: SHIPPED | OUTPATIENT
Start: 2021-12-13

## 2021-12-13 RX ORDER — METOPROLOL TARTRATE 100 MG/1
100 TABLET ORAL 2 TIMES DAILY WITH MEALS
Qty: 180 TABLET | Refills: 6 | Status: SHIPPED | OUTPATIENT
Start: 2021-12-13 | End: 2023-04-03

## 2021-12-13 RX ORDER — ATORVASTATIN CALCIUM 20 MG/1
20 TABLET, FILM COATED ORAL NIGHTLY
Qty: 90 TABLET | Refills: 3 | Status: SHIPPED | OUTPATIENT
Start: 2021-12-13

## 2022-03-21 ENCOUNTER — OFFICE VISIT (OUTPATIENT)
Dept: CARDIOLOGY | Facility: CLINIC | Age: 57
End: 2022-03-21

## 2022-03-21 VITALS
HEART RATE: 77 BPM | DIASTOLIC BLOOD PRESSURE: 81 MMHG | WEIGHT: 258 LBS | BODY MASS INDEX: 39.1 KG/M2 | SYSTOLIC BLOOD PRESSURE: 136 MMHG | HEIGHT: 68 IN

## 2022-03-21 DIAGNOSIS — Z95.5 HISTORY OF CORONARY ANGIOPLASTY WITH INSERTION OF STENT: ICD-10-CM

## 2022-03-21 DIAGNOSIS — E78.2 HYPERLIPEMIA, MIXED: ICD-10-CM

## 2022-03-21 DIAGNOSIS — I50.32 DIASTOLIC CHF, CHRONIC: ICD-10-CM

## 2022-03-21 DIAGNOSIS — I25.10 CORONARY ARTERY DISEASE INVOLVING NATIVE CORONARY ARTERY OF NATIVE HEART WITHOUT ANGINA PECTORIS: Primary | ICD-10-CM

## 2022-03-21 PROCEDURE — 99214 OFFICE O/P EST MOD 30 MIN: CPT | Performed by: SPECIALIST

## 2022-03-21 NOTE — PROGRESS NOTES
[facility]  Cardiology progress Note    Patient Name: Marcell Gunn  : 1965    CHIEF COMPLAINT  Coronary artery disease.      Subjective   Subjective     HISTORY OF PRESENT ILLNESS    Marcell Gunn is a 56 y.o. male with history of CAD s/p CABG.  No chest pain.    Review of Systems:   Constitutional no fever,  no weight loss   Skin no rash   Otolaryngeal no difficulty swallowing   Cardiovascular See HPI   Pulmonary no cough, no sputum production   Gastrointestinal no constipation, no diarrhea   Genitourinary no dysuria, no hematuria   Hematologic no easy bruisability, no abnormal bleeding   Musculoskeletal no muscle pain   Neurologic no dizziness, no falls         Personal History     Social History:  reports that he has never smoked. He has quit using smokeless tobacco.  His smokeless tobacco use included snuff. He reports current alcohol use. He reports that he does not use drugs.    Home Medications:  Current Outpatient Medications on File Prior to Visit   Medication Sig   • aspirin (aspirin) 81 MG EC tablet Take 1 tablet by mouth Daily.   • atorvastatin (LIPITOR) 20 MG tablet Take 1 tablet by mouth Every Night.   • Cholecalciferol (Vitamin D3) 25 MCG (1000 UT) capsule Take 1,000 Units by mouth Daily.   • furosemide (LASIX) 20 MG tablet Take 1 tablet by mouth Every Other Day.   • metoprolol tartrate (LOPRESSOR) 100 MG tablet Take 1 tablet by mouth 2 (Two) Times a Day With Meals.   • multivitamin with minerals tablet tablet Take 1 tablet by mouth Daily.   • Potassium 99 MG tablet Take  by mouth.   • Zinc 50 MG capsule Take 1 tablet by mouth Daily.     No current facility-administered medications on file prior to visit.     Allergies:  No Known Allergies    Objective    Objective       Vitals:   Heart Rate:  [77-79] 77  BP: (136-150)/(81-85) 136/81  Body mass index is 39.23 kg/m².     Physical Exam:   Constitutional: Awake, alert, No acute distress    Eyes: PERRLA, sclerae anicteric, no  conjunctival injection   HENT: NCAT, mucous membranes moist   Neck: Supple, no thyromegaly, no lymphadenopathy, trachea midline   Respiratory: Clear to auscultation bilaterally, nonlabored respirations    Cardiovascular: RRR, no murmurs or rubs. Palpable pedal pulses bilaterally   Musculoskeletal: No bilateral ankle edema, no cyanosis to extremities   Psychiatric: Appropriate affect, cooperative   Neurologic: Oriented x 3, strength symmetric in all extremities, Cranial Nerves grossly intact to confrontation, speech clear   Skin: No rashes.    Result Review    Result Review:  I have personally reviewed the available results from  [x]  Laboratory  [x]  EKG  [x]  Cardiology  [x]  Medications  [x]  Old records  []  Other:   Procedures  Lab Results   Component Value Date    CHOL 166 08/09/2021     Lab Results   Component Value Date    TRIG 210 (H) 08/09/2021     Lab Results   Component Value Date    HDL 45 08/09/2021     Lab Results   Component Value Date    LDL 86 08/09/2021     Lab Results   Component Value Date    VLDL 35 08/09/2021       Impression/Plan:  1.  Coronary artery s/p CABG stable: Continue aspirin 81 mg a day.  Continue metoprolol 100 mg twice daily.  2.  Hyperlipidemia: Continue Lipitor 20 mg a day.  Lipid profile reviewed.  3.  Chronic diastolic heart failure stable: Continue Lasix 20 mg every other day.           Calderon Estes MD   03/21/22   12:35 EDT

## 2022-10-03 ENCOUNTER — OFFICE VISIT (OUTPATIENT)
Dept: CARDIOLOGY | Facility: CLINIC | Age: 57
End: 2022-10-03

## 2022-10-03 VITALS
HEIGHT: 68 IN | HEART RATE: 70 BPM | SYSTOLIC BLOOD PRESSURE: 140 MMHG | WEIGHT: 254 LBS | BODY MASS INDEX: 38.49 KG/M2 | DIASTOLIC BLOOD PRESSURE: 80 MMHG

## 2022-10-03 DIAGNOSIS — I25.10 CORONARY ARTERY DISEASE INVOLVING NATIVE CORONARY ARTERY OF NATIVE HEART WITHOUT ANGINA PECTORIS: Primary | ICD-10-CM

## 2022-10-03 DIAGNOSIS — I50.32 DIASTOLIC CHF, CHRONIC: ICD-10-CM

## 2022-10-03 DIAGNOSIS — E78.2 HYPERLIPEMIA, MIXED: ICD-10-CM

## 2022-10-03 DIAGNOSIS — Z95.1 HX OF CABG: ICD-10-CM

## 2022-10-03 PROCEDURE — 99214 OFFICE O/P EST MOD 30 MIN: CPT | Performed by: SPECIALIST

## 2022-10-03 RX ORDER — LOSARTAN POTASSIUM 25 MG/1
25 TABLET ORAL DAILY
COMMUNITY
Start: 2022-08-10

## 2022-10-03 NOTE — PROGRESS NOTES
[facility]  Cardiology progress Note    Patient Name: Marcell Gunn  : 1965    CHIEF COMPLAINT  CORONARY ARTERY DISEASE        Subjective   Subjective     HISTORY OF PRESENT ILLNESS    Marcell Gunn is a 56 y.o. male with coronary disease s/p CABG.  No chest pain or shortness of breath.    REVIEW OF SYSTEMS    Constitutional:    No fever, no weight loss  Skin:     No rash  Otolaryngeal:    No difficulty swallowing  Cardiovascular:  No chest pain or shortness of breath  Pulmonary:    No cough, no sputum production    Personal History     Social History:    reports that he has never smoked. He has quit using smokeless tobacco.  His smokeless tobacco use included snuff. He reports current alcohol use. He reports that he does not use drugs.    Home Medications:  Current Outpatient Medications on File Prior to Visit   Medication Sig   • aspirin (aspirin) 81 MG EC tablet Take 1 tablet by mouth Daily.   • atorvastatin (LIPITOR) 20 MG tablet Take 1 tablet by mouth Every Night.   • Cholecalciferol (Vitamin D3) 25 MCG (1000 UT) capsule Take 1,000 Units by mouth Daily.   • furosemide (LASIX) 20 MG tablet Take 1 tablet by mouth Every Other Day.   • losartan (COZAAR) 25 MG tablet Take 25 mg by mouth Daily.   • metoprolol tartrate (LOPRESSOR) 100 MG tablet Take 1 tablet by mouth 2 (Two) Times a Day With Meals.   • multivitamin with minerals tablet tablet Take 1 tablet by mouth Daily.   • Potassium 99 MG tablet Take  by mouth.   • Zinc 50 MG capsule Take 1 tablet by mouth Daily.     No current facility-administered medications on file prior to visit.       Past Medical History:   Diagnosis Date   • Coronary artery disease    • Hyperlipidemia    • Hypertension    • Sleep apnea        Allergies:  No Known Allergies    Objective    Objective       Vitals:   Heart Rate:  [70-72] 70  BP: (140-146)/(74-80) 140/80  Body mass index is 38.62 kg/m².     PHYSICAL EXAM:    General Appearance:   · well  developed  · well nourished  HENT:   · oropharynx moist  · lips not cyanotic  Neck:  · thyroid not enlarged  · supple  Respiratory:  · no respiratory distress  · normal breath sounds  · no rales  Cardiovascular:  · no jugular venous distention  · regular rhythm  · apical impulse normal  · S1 normal, S2 normal  · no S3, no S4   · no murmur  · no rub, no thrill  · carotid pulses normal; no bruit  · pedal pulses normal  · lower extremity edema: none    Skin:   · warm, dry  Psychiatric:  · judgement and insight appropriate  · normal mood and affect        Result Review:  I have personally reviewed the available results from  [x]  Laboratory  [x]  EKG  [x]  Cardiology  [x]  Medications  [x]  Old records  []  Other:     Procedures  Lab Results   Component Value Date    CHOL 166 08/09/2021     Lab Results   Component Value Date    TRIG 210 (H) 08/09/2021     Lab Results   Component Value Date    HDL 45 08/09/2021     Lab Results   Component Value Date    LDL 86 08/09/2021     Lab Results   Component Value Date    VLDL 35 08/09/2021        Impression/Plan:  1.  Hyperlipidemia: Continue Lipitor 20 mg a day.  Monitor lipid and hepatic profile.  2.  Chronic diastolic heart failure stable: Continue Lasix 20 mg every other day.  Monitor BMP.  3.  Coronary s/p CABG stable: Continue aspirin 81 mg a day.  Continue metoprolol 100 mg twice a day.  No chest pain.  4.  Essential hypertension: Continue Cozaar 25 mg a day.  Monitor blood pressure regularly.  Blood pressure persistently high increase losartan to 50 mg a day.        Calderon Estes MD   10/03/22   13:27 EDT

## 2023-04-03 RX ORDER — METOPROLOL TARTRATE 100 MG/1
TABLET ORAL
Qty: 180 TABLET | Refills: 3 | Status: SHIPPED | OUTPATIENT
Start: 2023-04-03 | End: 2023-04-10 | Stop reason: SDUPTHER

## 2023-04-08 NOTE — PROGRESS NOTES
[facility]  Cardiology progress Note    Patient Name: Marcell Gunn  : 1965    CHIEF COMPLAINT  Coronary artery disease        Subjective   Subjective     HISTORY OF PRESENT ILLNESS    Marcell Gunn is a 57 y.o. male with coronary disease s/p CABG.  No chest pain or shortness of breath.    REVIEW OF SYSTEMS    Constitutional:    No fever, no weight loss  Skin:     No rash  Otolaryngeal:    No difficulty swallowing  Cardiovascular: See HPI.  Pulmonary:    No cough, no sputum production    Personal History     Social History:    reports that he has never smoked. He has quit using smokeless tobacco.  His smokeless tobacco use included snuff. He reports current alcohol use. He reports that he does not use drugs.    Home Medications:  Current Outpatient Medications on File Prior to Visit   Medication Sig   • aspirin (aspirin) 81 MG EC tablet Take 1 tablet by mouth Daily.   • atorvastatin (LIPITOR) 40 MG tablet Take 1 tablet by mouth Daily.   • Cholecalciferol (Vitamin D3) 25 MCG (1000 UT) capsule Take 1 capsule by mouth Daily.   • furosemide (LASIX) 20 MG tablet Take 1 tablet by mouth Every Other Day. (Patient taking differently: Take 1 tablet by mouth Every Other Day. Ran out for at least 2 months)   • multivitamin with minerals tablet tablet Take 1 tablet by mouth Daily.   • Potassium 99 MG tablet Take  by mouth.   • Zinc 50 MG capsule Take 1 tablet by mouth Daily.   • [DISCONTINUED] atorvastatin (LIPITOR) 20 MG tablet Take 1 tablet by mouth Every Night.   • [DISCONTINUED] losartan (COZAAR) 25 MG tablet Take 1 tablet by mouth Daily.   • [DISCONTINUED] metoprolol tartrate (LOPRESSOR) 100 MG tablet TAKE 1 TABLET BY MOUTH TWICE DAILY WITH MEALS     No current facility-administered medications on file prior to visit.       Past Medical History:   Diagnosis Date   • Coronary artery disease    • Hyperlipidemia    • Hypertension    • Sleep apnea        Allergies:  No Known Allergies    Objective     Objective       Vitals:   Heart Rate:  [62-63] 62  BP: (140-150)/(77-80) 140/77  Body mass index is 40.1 kg/m².     PHYSICAL EXAM:    General Appearance:   · well developed  · well nourished  HENT:   · oropharynx moist  · lips not cyanotic  Neck:  · thyroid not enlarged  · supple  Respiratory:  · no respiratory distress  · normal breath sounds  · no rales  Cardiovascular:  · no jugular venous distention  · regular rhythm  · apical impulse normal  · S1 normal, S2 normal  · no S3, no S4   · no murmur  · no rub, no thrill  · carotid pulses normal; no bruit  · pedal pulses normal  · lower extremity edema: none    Skin:   · warm, dry  Psychiatric:  · judgement and insight appropriate  · normal mood and affect        Result Review:  I have personally reviewed the available results from  [x]  Laboratory  [x]  EKG  [x]  Cardiology  [x]  Medications  [x]  Old records  []  Other:     Procedures  Lab Results   Component Value Date    CHOL 166 08/09/2021     Lab Results   Component Value Date    TRIG 210 (H) 08/09/2021     Lab Results   Component Value Date    HDL 45 08/09/2021     Lab Results   Component Value Date    LDL 86 08/09/2021     Lab Results   Component Value Date    VLDL 35 08/09/2021     Results for orders placed in visit on 08/10/21    Emergent/Open-Heart Anesthesia JABIER    Narrative  Preanesthesia Checklist:  Patient identified, IV assessed, risks and benefits discussed, monitors and equipment assessed, procedure being performed at surgeon's request and anesthesia consent obtained.    General Procedure Information  JABIER Placed for monitoring purposes only -- This is not a diagnostic JABIER          Anesthesia Information      Echocardiogram Comments:  JABIER placed easily x 1 attempt  DMP     Impression/Plan:  1.  Coronary disease status post CABG stable: Continue aspirin 81 mg once a day.  Continue metoprolol 100 mg twice a day.  No chest pain.  2.  Essential hypertension controlled: Continue Cozaar 25 mg once a day.   Continue losartan 50 mg once a day.  Monitor blood pressure regularly.  3.  Chronic diastolic heart failure stable: Continue Lasix 20 mg every other day as needed basis. monitor BMP.  4.  Hyperlipidemia: Continue Lipitor 40 mg once a day.  Monitor lipid and hepatic profile.           Calderon Estes MD   04/10/23   14:34 EDT

## 2023-04-10 ENCOUNTER — OFFICE VISIT (OUTPATIENT)
Dept: CARDIOLOGY | Facility: CLINIC | Age: 58
End: 2023-04-10
Payer: COMMERCIAL

## 2023-04-10 VITALS
HEIGHT: 67 IN | BODY MASS INDEX: 40.18 KG/M2 | WEIGHT: 256 LBS | SYSTOLIC BLOOD PRESSURE: 140 MMHG | HEART RATE: 62 BPM | DIASTOLIC BLOOD PRESSURE: 77 MMHG

## 2023-04-10 DIAGNOSIS — I25.10 CORONARY ARTERY DISEASE INVOLVING NATIVE CORONARY ARTERY OF NATIVE HEART WITHOUT ANGINA PECTORIS: ICD-10-CM

## 2023-04-10 DIAGNOSIS — Z95.1 HX OF CABG: ICD-10-CM

## 2023-04-10 DIAGNOSIS — I50.32 DIASTOLIC CHF, CHRONIC: Primary | ICD-10-CM

## 2023-04-10 DIAGNOSIS — I10 HYPERTENSION, ESSENTIAL: ICD-10-CM

## 2023-04-10 PROCEDURE — 99214 OFFICE O/P EST MOD 30 MIN: CPT | Performed by: SPECIALIST

## 2023-04-10 RX ORDER — METOPROLOL TARTRATE 100 MG/1
100 TABLET ORAL 2 TIMES DAILY WITH MEALS
Qty: 180 TABLET | Refills: 3 | Status: SHIPPED | OUTPATIENT
Start: 2023-04-10

## 2023-04-10 RX ORDER — LOSARTAN POTASSIUM 25 MG/1
25 TABLET ORAL DAILY
Qty: 90 TABLET | Refills: 3 | Status: SHIPPED | OUTPATIENT
Start: 2023-04-10

## 2023-04-10 RX ORDER — ATORVASTATIN CALCIUM 40 MG/1
1 TABLET, FILM COATED ORAL DAILY
COMMUNITY
Start: 2023-02-28

## 2023-10-11 NOTE — PROGRESS NOTES
[facility]  Cardiology progress Note    Patient Name: Marcell Gunn  : 1965    CHIEF COMPLAINT  CAD        Subjective   Subjective     HISTORY OF PRESENT ILLNESS    Marcell Gunn is a 57 y.o. male with CAD s/p CABG.  No chest pain.    REVIEW OF SYSTEMS    Constitutional:    No fever, no weight loss  Skin:     No rash  Otolaryngeal:    No difficulty swallowing  Cardiovascular: See HPI.  Pulmonary:    No cough, no sputum production    Personal History     Social History:    reports that he has never smoked. He has quit using smokeless tobacco.  His smokeless tobacco use included snuff. He reports current alcohol use. He reports that he does not use drugs.    Home Medications:  Current Outpatient Medications on File Prior to Visit   Medication Sig    aspirin (aspirin) 81 MG EC tablet Take 1 tablet by mouth Daily.    atorvastatin (LIPITOR) 40 MG tablet Take 1 tablet by mouth Daily.    Cholecalciferol (Vitamin D3) 25 MCG (1000 UT) capsule Take 1 capsule by mouth Daily.    fenofibrate (TRICOR) 145 MG tablet Take 1 tablet by mouth Daily.    metoprolol tartrate (LOPRESSOR) 100 MG tablet Take 1 tablet by mouth 2 (Two) Times a Day With Meals.    multivitamin with minerals tablet tablet Take 1 tablet by mouth Daily.    Potassium 99 MG tablet Take  by mouth.    Zinc 50 MG capsule Take 1 tablet by mouth Daily.    [DISCONTINUED] losartan (COZAAR) 25 MG tablet Take 1 tablet by mouth Daily. (Patient taking differently: Take 1 tablet by mouth Daily. 75mg)    [DISCONTINUED] furosemide (LASIX) 20 MG tablet Take 1 tablet by mouth Every Other Day. (Patient not taking: Reported on 10/12/2023)     No current facility-administered medications on file prior to visit.       Past Medical History:   Diagnosis Date    Coronary artery disease     Hyperlipidemia     Hypertension     Sleep apnea        Allergies:  No Known Allergies    Objective    Objective       Vitals:   Heart Rate:  [68] 68  BP: (173)/(84) 173/84  Body  mass index is 39.63 kg/mý.     PHYSICAL EXAM:    General Appearance:   well developed  well nourished  HENT:   oropharynx moist  lips not cyanotic  Neck:  thyroid not enlarged  supple  Respiratory:  no respiratory distress  normal breath sounds  no rales  Cardiovascular:  no jugular venous distention  regular rhythm  apical impulse normal  S1 normal, S2 normal  no S3, no S4   no murmur  no rub, no thrill  carotid pulses normal; no bruit  pedal pulses normal  lower extremity edema: none    Skin:   warm, dry  Psychiatric:  judgement and insight appropriate  normal mood and affect        Result Review:  I have personally reviewed the available results from  [x]  Laboratory  [x]  EKG  [x]  Cardiology  [x]  Medications  [x]  Old records  []  Other:     Procedures  Lab Results   Component Value Date    CHOL 166 08/09/2021     Lab Results   Component Value Date    TRIG 210 (H) 08/09/2021     Lab Results   Component Value Date    HDL 45 08/09/2021     Lab Results   Component Value Date    LDL 86 08/09/2021     Lab Results   Component Value Date    VLDL 35 08/09/2021     Results for orders placed in visit on 08/10/21    Emergent/Open-Heart Anesthesia AJBIER    Narrative  Preanesthesia Checklist:  Patient identified, IV assessed, risks and benefits discussed, monitors and equipment assessed, procedure being performed at surgeon's request and anesthesia consent obtained.    General Procedure Information  JABIER Placed for monitoring purposes only -- This is not a diagnostic JABIER          Anesthesia Information      Echocardiogram Comments:  JABIER placed easily x 1 attempt  DMP     Impression/Plan:  1.  Chronic diastolic heart failure stable: Improved and stable.  Low salt diet advised.  2.  Essential hypertension controlled: Increase losartan to 100 mg once a day.  Monitor blood pressure regularly.  3.  Hyperlipidemia: Continue Lipitor 40 mg once a day.  Monitor lipid and hepatic profile.  4.  Coronary disease s/p CABG stable: Continue  aspirin 81 mg once a day.  Continue metoprolol 100 mg twice a day.  No chest pain.           Calderon Estes MD   10/12/23   13:50 EDT

## 2023-10-12 ENCOUNTER — OFFICE VISIT (OUTPATIENT)
Dept: CARDIOLOGY | Facility: CLINIC | Age: 58
End: 2023-10-12
Payer: COMMERCIAL

## 2023-10-12 VITALS
WEIGHT: 253 LBS | HEIGHT: 67 IN | DIASTOLIC BLOOD PRESSURE: 84 MMHG | SYSTOLIC BLOOD PRESSURE: 173 MMHG | HEART RATE: 68 BPM | BODY MASS INDEX: 39.71 KG/M2

## 2023-10-12 DIAGNOSIS — I50.32 DIASTOLIC CHF, CHRONIC: ICD-10-CM

## 2023-10-12 DIAGNOSIS — E78.2 HYPERLIPEMIA, MIXED: ICD-10-CM

## 2023-10-12 DIAGNOSIS — I25.10 CORONARY ARTERY DISEASE INVOLVING NATIVE CORONARY ARTERY OF NATIVE HEART WITHOUT ANGINA PECTORIS: Primary | ICD-10-CM

## 2023-10-12 DIAGNOSIS — I10 HYPERTENSION, ESSENTIAL: ICD-10-CM

## 2023-10-12 RX ORDER — LOSARTAN POTASSIUM 100 MG/1
100 TABLET ORAL DAILY
Qty: 90 TABLET | Refills: 6 | Status: SHIPPED | OUTPATIENT
Start: 2023-10-12

## 2023-10-12 RX ORDER — FENOFIBRATE 145 MG/1
145 TABLET, COATED ORAL DAILY
COMMUNITY

## 2023-12-15 NOTE — NURSING NOTE
The ABCs of the Annual Wellness Visit  Subsequent Medicare Wellness Visit    Subjective      Tania Payne is a 68 y.o. female who presents for a Subsequent Medicare Wellness Visit.    The following portions of the patient's history were reviewed and   updated as appropriate: allergies, current medications, past family history, past medical history, past social history, past surgical history, and problem list.    Compared to one year ago, the patient feels her physical   health is the same.    Compared to one year ago, the patient feels her mental   health is the same.    Recent Hospitalizations:  She was not admitted to the hospital during the last year.       Current Medical Providers:  Patient Care Team:  Shanta Hannah APRN as PCP - General (Internal Medicine)    Outpatient Medications Prior to Visit   Medication Sig Dispense Refill    baclofen (LIORESAL) 10 MG tablet Take 1 tablet by mouth 3 (Three) Times a Day As Needed for Muscle Spasms. 90 tablet 1    cholecalciferol (VITAMIN D3) 25 MCG (1000 UT) tablet Take 2 tablets by mouth Daily.      clonazePAM (KlonoPIN) 0.125 MG disintegrating tablet Place 1 tablet on the tongue and dissolve daily. 30 tablet 1    fenofibrate (Tricor) 145 MG tablet Take 1 tablet by mouth Daily. 90 tablet 1    gabapentin (NEURONTIN) 300 MG capsule Take 1 capsule by mouth 2 (Two) Times a Day. 180 capsule 0    multivitamin with minerals tablet tablet Take 1 tablet by mouth Daily.      pravastatin (PRAVACHOL) 40 MG tablet Take 1 tablet by mouth Daily. 90 tablet 1    vitamin B-12 (CYANOCOBALAMIN) 1000 MCG tablet Take 1 tablet by mouth Daily.      amoxicillin (AMOXIL) 500 MG capsule Take 1 capsule by mouth 3 times a day until finished. (Patient not taking: Reported on 12/15/2023) 30 capsule 0     No facility-administered medications prior to visit.       No opioid medication identified on active medication list. I have reviewed chart for other potential  high risk medication/s and harmful  This RN attempted SBT after patient woke up in the 2300 hour of 08/10/2021. Patient developed respiratory rate of 50 and desaturation at 80%. Patient was sedated once again for oxygenation purposes.   "drug interactions in the elderly.        Aspirin is not on active medication list.  Aspirin use is not indicated based on review of current medical condition/s. Risk of harm outweighs potential benefits.  .    Patient Active Problem List   Diagnosis    Vitamin D deficiency    Vitamin B 12 deficiency    Mixed hyperlipidemia    Iron deficiency anemia    Fatigue    Multiple sclerosis    Focal seizures    Chronic pain syndrome    Hemifacial spasm of left side of face    Migraine without aura and without status migrainosus, not intractable    Chronic non-specific white matter lesions on MRI    Hematuria    Trigger finger of right thumb    Welcome to Medicare preventive visit    Chest pain varying with breathing     Advance Care Planning   Advance Care Planning     Advance Directive is on file.  ACP discussion was held with the patient during this visit. Patient has an advance directive in EMR which is still valid.      Objective    Vitals:    12/15/23 1050   BP: 116/64   BP Location: Left arm   Patient Position: Sitting   Pulse: 64   Resp: 18   Temp: 98.4 °F (36.9 °C)   TempSrc: Temporal   SpO2: 98%   Weight: 87.1 kg (192 lb)   Height: 170.2 cm (67.01\")     Estimated body mass index is 30.06 kg/m² as calculated from the following:    Height as of this encounter: 170.2 cm (67.01\").    Weight as of this encounter: 87.1 kg (192 lb).           Does the patient have evidence of cognitive impairment?   No    Lab Results   Component Value Date    TRIG 297 (H) 2023    HDL 37 (L) 2023     (H) 2023    VLDL 52 (H) 2023          HEALTH RISK ASSESSMENT    Smoking Status:  Social History     Tobacco Use   Smoking Status Former    Packs/day: 0.50    Years: 10.00    Additional pack years: 0.00    Total pack years: 5.00    Types: Cigarettes    Start date: 1971    Quit date: 1981    Years since quittin.0   Smokeless Tobacco Never   Tobacco Comments    Intermittent smoking that totaled approx " 10 years     Alcohol Consumption:  Social History     Substance and Sexual Activity   Alcohol Use Never     Fall Risk Screen:    KRAIGADI Fall Risk Assessment was completed, and patient is at LOW risk for falls.Assessment completed on:12/15/2023    Depression Screenin/15/2023    10:38 AM   PHQ-2/PHQ-9 Depression Screening   Little Interest or Pleasure in Doing Things 0-->not at all   Feeling Down, Depressed or Hopeless 0-->not at all   PHQ-9: Brief Depression Severity Measure Score 0       Health Habits and Functional and Cognitive Screenin/15/2023    10:45 AM   Functional & Cognitive Status   Do you have difficulty preparing food and eating? No   Do you have difficulty bathing yourself, getting dressed or grooming yourself? No   Do you have difficulty using the toilet? No   Do you have difficulty moving around from place to place? No   Do you have trouble with steps or getting out of a bed or a chair? No   Current Diet Well Balanced Diet   Dental Exam Up to date   Eye Exam Up to date   Exercise (times per week) 1 times per week   Current Exercises Include Walking;Treadmill   Do you need help using the phone?  No   Are you deaf or do you have serious difficulty hearing?  Yes   Do you need help to go to places out of walking distance? No   Do you need help shopping? No   Do you need help preparing meals?  No   Do you need help with housework?  No   Do you need help with laundry? No   Do you need help taking your medications? No   Do you need help managing money? No   Do you ever drive or ride in a car without wearing a seat belt? No       Age-appropriate Screening Schedule:  Refer to the list below for future screening recommendations based on patient's age, sex and/or medical conditions. Orders for these recommended tests are listed in the plan section. The patient has been provided with a written plan.    Health Maintenance   Topic Date Due    COVID-19 Vaccine ( season) 2023     ZOSTER VACCINE (1 of 2) 12/15/2023 (Originally 8/4/2006)    INFLUENZA VACCINE  12/29/2023 (Originally 8/1/2023)    TDAP/TD VACCINES (1 - Tdap) 04/30/2024 (Originally 8/4/1975)    DXA SCAN  02/08/2024    MAMMOGRAM  03/21/2024    LIPID PANEL  11/02/2024    BMI FOLLOWUP  11/13/2024    ANNUAL WELLNESS VISIT  12/15/2024    COLORECTAL CANCER SCREENING  05/01/2029    HEPATITIS C SCREENING  Completed    Pneumococcal Vaccine 65+  Completed                  CMS Preventative Services Quick Reference  Risk Factors Identified During Encounter:    Immunizations Discussed/Encouraged: Tdap, Influenza, Shingrix, COVID19, and RSV (Respiratory Syncytial Virus)  Dental Screening Recommended  Vision Screening Recommended    The above risks/problems have been discussed with the patient.  Pertinent information has been shared with the patient in the After Visit Summary.    Diagnoses and all orders for this visit:    1. Encounter for subsequent annual wellness visit (AWV) in Medicare patient (Primary)    2. Dermatitis  -     clobetasol (TEMOVATE) 0.05 % cream; Apply topically to the appropriate area as directed 2 (Two) Times a Day for 14 days.  Dispense: 30 g; Refill: 1    3. Tinnitus of both ears  -     Ambulatory Referral to Audiology    4. Decreased hearing of both ears  -     Ambulatory Referral to Audiology    5. Needs flu shot  -     Fluzone High-Dose 65+yrs (1423-7706)    6. Impaired fasting glucose  -     Hemoglobin A1c; Future    7. Postmenopausal  -     DEXA Bone Density Axial; Future    8. Encounter for screening mammogram for malignant neoplasm of breast  -     Mammo Screening Digital Tomosynthesis Bilateral With CAD; Future    Other orders  -     Cancel: COVID-19 F23 (Pfizer) 12yrs+ (COMIRNATY)      Worsening tinnitus/decreased hearing-would like referral audiology    Follow Up:   Next Medicare Wellness visit to be scheduled in 1 year.      An After Visit Summary and PPPS were made available to the patient.

## 2024-05-17 NOTE — PROGRESS NOTES
[facility]  Cardiology progress Note    Patient Name: Marcell Gunn  : 1965    CHIEF COMPLAINT  CAD        Subjective   Subjective     HISTORY OF PRESENT ILLNESS    Marcell Gunn is a 58 y.o. male with CAD s/p CABG.  No chest pain or shortness of breath    REVIEW OF SYSTEMS    Constitutional:    No fever, no weight loss  Skin:     No rash  Otolaryngeal:    No difficulty swallowing  Cardiovascular: See HPI.  Pulmonary:    No cough, no sputum production    Personal History     Social History:    reports that he has never smoked. He has quit using smokeless tobacco.  His smokeless tobacco use included snuff. He reports current alcohol use. He reports that he does not use drugs.    Home Medications:  Current Outpatient Medications on File Prior to Visit   Medication Sig    aspirin (aspirin) 81 MG EC tablet Take 1 tablet by mouth Daily.    atorvastatin (LIPITOR) 40 MG tablet Take 1 tablet by mouth Daily.    chlorthalidone (HYGROTON) 25 MG tablet Daily.    Cholecalciferol (Vitamin D3) 25 MCG (1000 UT) capsule Take 1 capsule by mouth Daily.    fenofibrate micronized (LOFIBRA) 200 MG capsule Take 1 tablet by mouth Daily.    losartan (COZAAR) 100 MG tablet Take 1 tablet by mouth Daily.    metFORMIN (GLUCOPHAGE) 500 MG tablet Take 1 tablet by mouth Daily With Breakfast.    metoprolol tartrate (LOPRESSOR) 100 MG tablet Take 1 tablet by mouth 2 (Two) Times a Day With Meals.    multivitamin with minerals tablet tablet Take 1 tablet by mouth Daily.    Potassium 99 MG tablet Take  by mouth.    Zinc 50 MG capsule Take 1 tablet by mouth Daily.    [DISCONTINUED] fenofibrate (TRICOR) 145 MG tablet Take 1 tablet by mouth Daily.     No current facility-administered medications on file prior to visit.       Past Medical History:   Diagnosis Date    Coronary artery disease     Hyperlipidemia     Hypertension     Sleep apnea        Allergies:  No Known Allergies    Objective    Objective       Vitals:   Heart Rate:   [68] 68  BP: (130)/(73) 130/73  Body mass index is 39.47 kg/m².     PHYSICAL EXAM:    General Appearance:   well developed  well nourished  HENT:   oropharynx moist  lips not cyanotic  Neck:  thyroid not enlarged  supple  Respiratory:  no respiratory distress  normal breath sounds  no rales  Cardiovascular:  no jugular venous distention  regular rhythm  apical impulse normal  S1 normal, S2 normal  no S3, no S4   no murmur  no rub, no thrill  carotid pulses normal; no bruit  pedal pulses normal  lower extremity edema: none    Skin:   warm, dry  Psychiatric:  judgement and insight appropriate  normal mood and affect        Result Review:  I have personally reviewed the available results from  [x]  Laboratory  [x]  EKG  [x]  Cardiology  [x]  Medications  [x]  Old records  []  Other:     Procedures  Lab Results   Component Value Date    CHOL 166 08/09/2021     Lab Results   Component Value Date    TRIG 210 (H) 08/09/2021     Lab Results   Component Value Date    HDL 45 08/09/2021     Lab Results   Component Value Date    LDL 86 08/09/2021     Lab Results   Component Value Date    VLDL 35 08/09/2021     Results for orders placed in visit on 08/10/21    Emergent/Open-Heart Anesthesia JABIER    Narrative  Preanesthesia Checklist:  Patient identified, IV assessed, risks and benefits discussed, monitors and equipment assessed, procedure being performed at surgeon's request and anesthesia consent obtained.    General Procedure Information  JABIER Placed for monitoring purposes only -- This is not a diagnostic JABIER          Anesthesia Information      Echocardiogram Comments:  JABIER placed easily x 1 attempt  DMP     Impression/Plan:  1.  Mixed hyperlipidemia: Continue Lipitor 40 mg once a day.  Monitor lipid and hepatic profile.  2.  Essential hypertension controlled: Continue losartan 100 mg once a day.  Monitor blood pressure regularly.  3.  Coronary disease s/p CABG stable: Continue aspirin 81 mg once a day.  Continue metoprolol  100 mg twice a day.  No chest pain.           Calderon Estes MD   05/20/24   12:08 EDT

## 2024-05-20 ENCOUNTER — OFFICE VISIT (OUTPATIENT)
Dept: CARDIOLOGY | Facility: CLINIC | Age: 59
End: 2024-05-20
Payer: COMMERCIAL

## 2024-05-20 VITALS
BODY MASS INDEX: 39.55 KG/M2 | SYSTOLIC BLOOD PRESSURE: 130 MMHG | DIASTOLIC BLOOD PRESSURE: 73 MMHG | HEART RATE: 68 BPM | HEIGHT: 67 IN | WEIGHT: 252 LBS

## 2024-05-20 DIAGNOSIS — I10 HYPERTENSION, ESSENTIAL: ICD-10-CM

## 2024-05-20 DIAGNOSIS — I25.10 CORONARY ARTERY DISEASE INVOLVING NATIVE CORONARY ARTERY OF NATIVE HEART WITHOUT ANGINA PECTORIS: Primary | ICD-10-CM

## 2024-05-20 DIAGNOSIS — E78.2 HYPERLIPEMIA, MIXED: ICD-10-CM

## 2024-05-20 DIAGNOSIS — Z95.1 HX OF CABG: ICD-10-CM

## 2024-05-20 PROCEDURE — 99214 OFFICE O/P EST MOD 30 MIN: CPT | Performed by: SPECIALIST

## 2024-05-20 RX ORDER — FENOFIBRATE 200 MG/1
1 CAPSULE ORAL DAILY
COMMUNITY

## 2024-05-20 RX ORDER — CHLORTHALIDONE 25 MG/1
TABLET ORAL EVERY 24 HOURS
COMMUNITY

## 2024-08-26 RX ORDER — METOPROLOL TARTRATE 100 MG
100 TABLET ORAL 2 TIMES DAILY WITH MEALS
Qty: 180 TABLET | Refills: 0 | Status: SHIPPED | OUTPATIENT
Start: 2024-08-26

## 2024-11-22 RX ORDER — METOPROLOL TARTRATE 100 MG/1
100 TABLET ORAL 2 TIMES DAILY WITH MEALS
Qty: 180 TABLET | Refills: 0 | Status: SHIPPED | OUTPATIENT
Start: 2024-11-22

## 2024-12-07 NOTE — PROGRESS NOTES
[facility]  Cardiology progress Note    Patient Name: Marcell Gunn  : 1965    CHIEF COMPLAINT  CAD        Subjective   Subjective     HISTORY OF PRESENT ILLNESS    Marcell Gunn is a 59 y.o. male with CAD status post CABG.  No chest pain.    REVIEW OF SYSTEMS    Constitutional:    No fever, no weight loss  Skin:     No rash  Otolaryngeal:    No difficulty swallowing  Cardiovascular: See HPI.  Pulmonary:    No cough, no sputum production    Personal History     Social History:    reports that he has never smoked. He has quit using smokeless tobacco.  His smokeless tobacco use included snuff. He reports current alcohol use. He reports that he does not use drugs.    Home Medications:  Current Outpatient Medications on File Prior to Visit   Medication Sig    aspirin (aspirin) 81 MG EC tablet Take 1 tablet by mouth Daily.    atorvastatin (LIPITOR) 40 MG tablet Take 1 tablet by mouth Daily.    chlorthalidone (HYGROTON) 25 MG tablet Daily.    Cholecalciferol (Vitamin D3) 25 MCG (1000 UT) capsule Take 1 capsule by mouth Daily.    fenofibrate micronized (LOFIBRA) 200 MG capsule Take 1 tablet by mouth Daily.    losartan (COZAAR) 100 MG tablet Take 1 tablet by mouth Daily.    metFORMIN (GLUCOPHAGE) 500 MG tablet Take 1 tablet by mouth Daily With Breakfast.    metoprolol tartrate (LOPRESSOR) 100 MG tablet TAKE 1 TABLET BY MOUTH TWICE DAILY WITH MEALS    multivitamin with minerals tablet tablet Take 1 tablet by mouth Daily.    Potassium 99 MG tablet Take  by mouth.    Zinc 50 MG capsule Take 1 tablet by mouth Daily.     No current facility-administered medications on file prior to visit.       Past Medical History:   Diagnosis Date    Coronary artery disease     Hyperlipidemia     Hypertension     Sleep apnea        Allergies:  No Known Allergies    Objective    Objective       Vitals:   Heart Rate:  [72-75] 72  BP: (137-144)/(53-67) 137/67  Body mass index is 39.63 kg/m².     PHYSICAL EXAM:    General  Appearance:   well developed  well nourished  HENT:   oropharynx moist  lips not cyanotic  Neck:  thyroid not enlarged  supple  Respiratory:  no respiratory distress  normal breath sounds  no rales  Cardiovascular:  no jugular venous distention  regular rhythm  apical impulse normal  S1 normal, S2 normal  no S3, no S4   no murmur  no rub, no thrill  carotid pulses normal; no bruit  pedal pulses normal  lower extremity edema: none    Skin:   warm, dry  Psychiatric:  judgement and insight appropriate  normal mood and affect        Result Review:  I have personally reviewed the available results from  [x]  Laboratory  [x]  EKG  [x]  Cardiology  [x]  Medications  [x]  Old records  []  Other:     Procedures  Lab Results   Component Value Date    CHOL 166 08/09/2021     Lab Results   Component Value Date    TRIG 210 (H) 08/09/2021     Lab Results   Component Value Date    HDL 45 08/09/2021     Lab Results   Component Value Date    LDL 86 08/09/2021     Lab Results   Component Value Date    VLDL 35 08/09/2021     Results for orders placed in visit on 08/10/21    Emergent/Open-Heart Anesthesia JABIER    Narrative  Preanesthesia Checklist:  Patient identified, IV assessed, risks and benefits discussed, monitors and equipment assessed, procedure being performed at surgeon's request and anesthesia consent obtained.    General Procedure Information  JABIER Placed for monitoring purposes only -- This is not a diagnostic JABIER          Anesthesia Information      Echocardiogram Comments:  JABIER placed easily x 1 attempt  DMP     Impression/Plan:  1.  CAD status post CABG stable: Continue aspirin 80 mg once a day.  Continue metoprolol 100 mg twice a day.  No chest pain.  2.  Essential hypertension controlled: Continue losartan 100 mg twice a day.  Monitor blood pressure regularly.  3.  Mixed hyperlipidemia: Continue Lipitor 40 mg once a day.  Monitor lipid and hepatic profile.           Calderon Estes MD   12/09/24   12:04 EST

## 2024-12-09 ENCOUNTER — OFFICE VISIT (OUTPATIENT)
Dept: CARDIOLOGY | Facility: CLINIC | Age: 59
End: 2024-12-09
Payer: COMMERCIAL

## 2024-12-09 VITALS
SYSTOLIC BLOOD PRESSURE: 137 MMHG | WEIGHT: 253 LBS | BODY MASS INDEX: 39.71 KG/M2 | HEART RATE: 72 BPM | DIASTOLIC BLOOD PRESSURE: 67 MMHG | HEIGHT: 67 IN

## 2024-12-09 DIAGNOSIS — E78.2 HYPERLIPEMIA, MIXED: ICD-10-CM

## 2024-12-09 DIAGNOSIS — I25.10 CORONARY ARTERY DISEASE INVOLVING NATIVE CORONARY ARTERY OF NATIVE HEART WITHOUT ANGINA PECTORIS: Primary | ICD-10-CM

## 2024-12-09 DIAGNOSIS — Z95.1 HX OF CABG: ICD-10-CM

## 2024-12-09 DIAGNOSIS — I10 HYPERTENSION, ESSENTIAL: ICD-10-CM

## 2024-12-09 PROCEDURE — 99214 OFFICE O/P EST MOD 30 MIN: CPT | Performed by: SPECIALIST

## 2024-12-09 NOTE — LETTER
2024     Alan Prince DO  [address] [address]    Patient: Marcell Gunn   YOB: 1965   Date of Visit: 2024       Dear Alan Prince,     Marcell Gunn was in my office today. Below is a copy of my note.    If you have questions, please do not hesitate to call me. I look forward to following Marcell along with you.         Sincerely,        Calderon Estes MD        CC: No Recipients      [facility]  Cardiology progress Note    Patient Name: Marcell Gunn  : 1965    CHIEF COMPLAINT  CAD        Subjective  Subjective     HISTORY OF PRESENT ILLNESS    Marcell Gunn is a 59 y.o. male with CAD status post CABG.  No chest pain.    REVIEW OF SYSTEMS    Constitutional:    No fever, no weight loss  Skin:     No rash  Otolaryngeal:    No difficulty swallowing  Cardiovascular: See HPI.  Pulmonary:    No cough, no sputum production    Personal History     Social History:    reports that he has never smoked. He has quit using smokeless tobacco.  His smokeless tobacco use included snuff. He reports current alcohol use. He reports that he does not use drugs.    Home Medications:  Current Outpatient Medications on File Prior to Visit   Medication Sig   • aspirin (aspirin) 81 MG EC tablet Take 1 tablet by mouth Daily.   • atorvastatin (LIPITOR) 40 MG tablet Take 1 tablet by mouth Daily.   • chlorthalidone (HYGROTON) 25 MG tablet Daily.   • Cholecalciferol (Vitamin D3) 25 MCG (1000 UT) capsule Take 1 capsule by mouth Daily.   • fenofibrate micronized (LOFIBRA) 200 MG capsule Take 1 tablet by mouth Daily.   • losartan (COZAAR) 100 MG tablet Take 1 tablet by mouth Daily.   • metFORMIN (GLUCOPHAGE) 500 MG tablet Take 1 tablet by mouth Daily With Breakfast.   • metoprolol tartrate (LOPRESSOR) 100 MG tablet TAKE 1 TABLET BY MOUTH TWICE DAILY WITH MEALS   • multivitamin with minerals tablet tablet Take 1 tablet by mouth Daily.   •  Potassium 99 MG tablet Take  by mouth.   • Zinc 50 MG capsule Take 1 tablet by mouth Daily.     No current facility-administered medications on file prior to visit.       Past Medical History:   Diagnosis Date   • Coronary artery disease    • Hyperlipidemia    • Hypertension    • Sleep apnea        Allergies:  No Known Allergies    Objective   Objective       Vitals:   Heart Rate:  [72-75] 72  BP: (137-144)/(53-67) 137/67  Body mass index is 39.63 kg/m².     PHYSICAL EXAM:    General Appearance:   well developed  well nourished  HENT:   oropharynx moist  lips not cyanotic  Neck:  thyroid not enlarged  supple  Respiratory:  no respiratory distress  normal breath sounds  no rales  Cardiovascular:  no jugular venous distention  regular rhythm  apical impulse normal  S1 normal, S2 normal  no S3, no S4   no murmur  no rub, no thrill  carotid pulses normal; no bruit  pedal pulses normal  lower extremity edema: none    Skin:   warm, dry  Psychiatric:  judgement and insight appropriate  normal mood and affect        Result Review:  I have personally reviewed the available results from  [x]  Laboratory  [x]  EKG  [x]  Cardiology  [x]  Medications  [x]  Old records  []  Other:     Procedures  Lab Results   Component Value Date    CHOL 166 08/09/2021     Lab Results   Component Value Date    TRIG 210 (H) 08/09/2021     Lab Results   Component Value Date    HDL 45 08/09/2021     Lab Results   Component Value Date    LDL 86 08/09/2021     Lab Results   Component Value Date    VLDL 35 08/09/2021     Results for orders placed in visit on 08/10/21    Emergent/Open-Heart Anesthesia JABIER    Narrative  Preanesthesia Checklist:  Patient identified, IV assessed, risks and benefits discussed, monitors and equipment assessed, procedure being performed at surgeon's request and anesthesia consent obtained.    General Procedure Information  JABIER Placed for monitoring purposes only -- This is not a diagnostic JABIER          Anesthesia  Information      Echocardiogram Comments:  JABIER placed easily x 1 attempt  DMP     Impression/Plan:  1.  CAD status post CABG stable: Continue aspirin 80 mg once a day.  Continue metoprolol 100 mg twice a day.  No chest pain.  2.  Essential hypertension controlled: Continue losartan 100 mg twice a day.  Monitor blood pressure regularly.  3.  Mixed hyperlipidemia: Continue Lipitor 40 mg once a day.  Monitor lipid and hepatic profile.           Calderon Estes MD   12/09/24   12:04 EST

## 2024-12-16 RX ORDER — LOSARTAN POTASSIUM 100 MG/1
100 TABLET ORAL DAILY
Qty: 90 TABLET | Refills: 3 | Status: SHIPPED | OUTPATIENT
Start: 2024-12-16

## 2025-02-17 RX ORDER — METOPROLOL TARTRATE 100 MG/1
100 TABLET ORAL 2 TIMES DAILY WITH MEALS
Qty: 180 TABLET | Refills: 3 | Status: SHIPPED | OUTPATIENT
Start: 2025-02-17

## 2025-03-17 NOTE — OP NOTE
[facility], [place], operated by [facility] CARDIAC SURGERY OP NOTE    Preop Diagnosis: Severe coronary artery disease.    Postop Diagnosis: Same LVH grade 6/6    Indications: This patient had a coronary calcium score that was high but had minimal symptoms.  His right was occluded and his circumflex was occluded.  His LAD was 99%.  Severe multivessel disease.  It was not stent double.  Operation was advisable to prolong life and relieve symptoms.The STS Risk and all risks and alternatives were discussed with the patient and family.  Counseling was done regarding abuse of tobacco, alcohol and drugs as needed. They understand and wish to proceed.    Procedure: CABG x8.  Skeletonized LIMA to D2 and then mid LAD.  Sequential vein graft acute marginal branch and then right coronary artery.  Vein graft to D1.  Vein graft to ramus intermedius.  Sequential vein graft to OM 2 and then OM 3.  Temporary cardiopulmonary bypass.  Antegrade and retrograde cold blood cardioplegia with warm reperfusion.  Neurologic monitoring.  Transesophageal echo.  Endoscopic vein harvest of the left greater saphenous vein.    Surgeon: Robbin Patten MD    Assistant: Assistant: Leana Cintron CSA was responsible for performing the following activities: Cardiac Surgery First assist, Endoscopic Vein [place] if needed for CABG,  surgical wound closure and their skilled assistance was necessary for the success of this case.     Anesthesia: GET    Findings : Body mass index is 37.56 kg/m².  There was mild mitral incompetence.  There was inferior wall scarring from the occluded right coronary artery and distal circumflex.  There was LVH grade 6/6.  The mammary was almost 3 mm in diameter with excellent blood flow.  The vein was 4.5 mm and good quality.  The acute marginal branch was 1.5 mm.  The right was diffusely diseased but was 1.5 mm.  D1 was 1.5 mm.  Ramus intermedius was 1.5.  OM 2 was 1.5 and the distal OM 3 was diffusely diseased but was   - BP well controlled  -continue amlodipine 2.5mg daily, coreg 12.5mg twice daily, flomax  -avoid high salt/sodium diet  -avoid caffeine   1.5 mm.  D2 was 1.5.  The LAD was 2 mm.    Operative Procedure: A primary median sternotomy was made while the left greater saphenous vein was harvested with the endoscope.  The internal mammary artery was dissected off the left chest wall with a harmonic scalpel and was skeletonized.  Cardiopulmonary bypass was then established for 103 minutes drifting to 34 °C at appropriate flow rates.  The aorta was crossclamped for 81 minutes and we gave 800 cc of antegrade cold blood cardioplegia then 2 L of retrograde cold blood cardioplegia and repeated doses every 10 to 15 minutes to good effect.  4 veins were anastomosed the ascending aorta with 6-0 Prolene marked with washers.  The first vein was sewn side to side to the acute marginal branch which had a huge amount of noncoronary collateral flow and then to the RCA with 7-0 Prolene.  The next vein was sewn to D1 with 7-0 Prolene.  The third vein was sewn to the ramus intermedius with 7-0 Prolene.  The last vein was sewn side to side to OM 2 and then to OM 3 with 7-0 Prolene.  The mammary was sewn side to side to D2 and then to the LAD with 7-0 Prolene.  A warm dose of retrograde cardioplegia was given and then with strong suction on the aortic needle vent the cross-clamp was released.  The patient was rewarmed and cardiopulmonary bypass was weaned and discontinued.  Decannulation was effected and the usual devices were placed.  Hemostasis was obtained.  3 chest tubes were inserted.  We applied vancomycin enriched platelet rich plasma.  The sternum was closed with stainless steel wires.  The fascia, soft tissues and skin were closed usual.  The sponge, needle and instrument counts noted to be correct.  All the grafts lay nicely and all the anastomoses were hemostatic.    Complications: None    Tubes: 3    Epicardial Wires: 3    Blood Loss: Minimal.  475 cc of Cell Saver.    Bypass Time: 103 min    Aortic cross-clamp time: 81 min    Specimen: None    Condition:  Good      Patient Care Team:  Provider, No Known as PCP - General        Robbin Patten MD  8/10/2021  11:29 EDT

## 2025-03-18 NOTE — NURSING NOTE
At 1636 and 1700, pt had significant vagal response.  Bearing down on ETT, unable to obtain volumes.  SBP 50's.  No movement of extremities, no response to name.  Medicated with Morphine x2, precedex gtt increased and resedated with Propofol with good results.  Will monitor.   Noted.       S/w pt. Conveyed that new script sent in. Patient verbalized understanding and will call back with any additional questions or concerns.

## 2025-06-09 NOTE — PROGRESS NOTES
[facility]  Cardiology progress Note    Patient Name: Marcell Gunn  : 1965    CHIEF COMPLAINT  CAD        Subjective   Subjective     HISTORY OF PRESENT ILLNESS    Marcell Gunn is a 59 y.o. male with CAD.  No chest pain.    REVIEW OF SYSTEMS    Constitutional:    No fever, no weight loss  Skin:     No rash  Otolaryngeal:    No difficulty swallowing  Cardiovascular: See HPI.  Pulmonary:    No cough, no sputum production    Personal History     Social History:    reports that he has never smoked. He has quit using smokeless tobacco.  His smokeless tobacco use included snuff. He reports current alcohol use. He reports that he does not use drugs.    Home Medications:  Current Outpatient Medications on File Prior to Visit   Medication Sig    aspirin (aspirin) 81 MG EC tablet Take 1 tablet by mouth Daily.    atorvastatin (LIPITOR) 40 MG tablet Take 1 tablet by mouth Daily.    chlorthalidone (HYGROTON) 25 MG tablet Daily.    Cholecalciferol (Vitamin D3) 25 MCG (1000 UT) capsule Take 1 capsule by mouth Daily.    fenofibrate micronized (LOFIBRA) 200 MG capsule Take 1 tablet by mouth Daily.    losartan (COZAAR) 100 MG tablet Take 1 tablet by mouth once daily    metFORMIN (GLUCOPHAGE) 500 MG tablet Take 1 tablet by mouth 2 (Two) Times a Day With Meals.    metoprolol tartrate (LOPRESSOR) 100 MG tablet TAKE 1 TABLET BY MOUTH TWICE DAILY WITH MEALS    multivitamin with minerals tablet tablet Take 1 tablet by mouth Daily.    Potassium 99 MG tablet Take  by mouth.    Zinc 50 MG capsule Take 1 tablet by mouth Daily.     No current facility-administered medications on file prior to visit.       Past Medical History:   Diagnosis Date    Coronary artery disease     Hyperlipidemia     Hypertension     Sleep apnea        Allergies:  No Known Allergies    Objective    Objective       Vitals:   Heart Rate:  [70] 70  BP: (124)/(54) 124/54  Body mass index is 39.78 kg/m².     PHYSICAL EXAM:    General Appearance:    well developed  well nourished  HENT:   oropharynx moist  lips not cyanotic  Neck:  thyroid not enlarged  supple  Respiratory:  no respiratory distress  normal breath sounds  no rales  Cardiovascular:  no jugular venous distention  regular rhythm  apical impulse normal  S1 normal, S2 normal  no S3, no S4   no murmur  no rub, no thrill  carotid pulses normal; no bruit  pedal pulses normal  lower extremity edema: none    Skin:   warm, dry  Psychiatric:  judgement and insight appropriate  normal mood and affect        Result Review:  I have personally reviewed the available results from  [x]  Laboratory  [x]  EKG  [x]  Cardiology  [x]  Medications  [x]  Old records  []  Other:     Procedures  Lab Results   Component Value Date    CHOL 166 08/09/2021     Lab Results   Component Value Date    TRIG 210 (H) 08/09/2021     Lab Results   Component Value Date    HDL 45 08/09/2021     Lab Results   Component Value Date    LDL 86 08/09/2021     Lab Results   Component Value Date    VLDL 35 08/09/2021     Results for orders placed in visit on 08/10/21    Emergent/Open-Heart Anesthesia JABIER    Narrative  Preanesthesia Checklist:  Patient identified, IV assessed, risks and benefits discussed, monitors and equipment assessed, procedure being performed at surgeon's request and anesthesia consent obtained.    General Procedure Information  JABIER Placed for monitoring purposes only -- This is not a diagnostic JABIER          Anesthesia Information      Echocardiogram Comments:  JABIER placed easily x 1 attempt  DMP     Impression/Plan:  1.  CAD status post CABG stable: Continue aspirin 80 mg once a day.  Continue metoprolol 100 mg twice a day.  No chest pain.  2.  Essential hypertension controlled: Continue losartan 100 mg twice a day.  Monitor blood pressure regularly.  3.  Mixed hyperlipidemia: Continue Lipitor 40 mg once a day.  Monitor lipid and hepatic profile.                 Calderon Estes MD   06/12/25   12:23 EDT

## 2025-06-12 ENCOUNTER — OFFICE VISIT (OUTPATIENT)
Dept: CARDIOLOGY | Facility: CLINIC | Age: 60
End: 2025-06-12
Payer: COMMERCIAL

## 2025-06-12 VITALS
DIASTOLIC BLOOD PRESSURE: 54 MMHG | BODY MASS INDEX: 39.87 KG/M2 | SYSTOLIC BLOOD PRESSURE: 124 MMHG | WEIGHT: 254 LBS | HEART RATE: 70 BPM | HEIGHT: 67 IN

## 2025-06-12 DIAGNOSIS — I10 HYPERTENSION, ESSENTIAL: Primary | ICD-10-CM

## 2025-06-12 DIAGNOSIS — Z95.1 HX OF CABG: ICD-10-CM

## 2025-06-12 DIAGNOSIS — I25.10 CORONARY ARTERY DISEASE INVOLVING NATIVE CORONARY ARTERY OF NATIVE HEART WITHOUT ANGINA PECTORIS: ICD-10-CM

## 2025-06-12 DIAGNOSIS — E78.2 HYPERLIPEMIA, MIXED: ICD-10-CM

## 2025-06-12 PROCEDURE — 99214 OFFICE O/P EST MOD 30 MIN: CPT | Performed by: SPECIALIST

## (undated) DEVICE — MODEL AT P65, P/N 701554-001KIT CONTENTS: HAND CONTROLLER, 3-WAY HIGH-PRESSURE STOPCOCK WITH ROTATING END AND PREMIUM HIGH-PRESSURE TUBING: Brand: ANGIOTOUCH® KIT

## (undated) DEVICE — SENSR CERBRL O2 PK/2

## (undated) DEVICE — Device: Brand: LEVEL 1

## (undated) DEVICE — BLD CLIP UNIV SURG GRY

## (undated) DEVICE — GLV SURG BIOGEL SENSR LTX PF SZ7.5

## (undated) DEVICE — 32 FR STRAIGHT – SOFT PVC CATHETER: Brand: PVC THORACIC CATHETERS

## (undated) DEVICE — PK ATS CUST W CARDIOTOMY RESEVOIR

## (undated) DEVICE — BRACHIAL/AXILLARY/CEREBRAL DRAPE 38 1/2" X 60": Brand: BRACHIAL/AXILLARY/CEREBRAL DRAPE

## (undated) DEVICE — SUT VIC 0 CT1 CR8 27IN JJ41G

## (undated) DEVICE — HEMOCONCENTRATOR PERFUS LPS06

## (undated) DEVICE — A2000 MULTI-USE SYRINGE KIT, P/N 701277-003KIT CONTENTS: 100ML CONTRAST RESERVOIR AND TUBING WITH CONTRAST SPIKE AND CLAMP: Brand: A2000 MULTI-USE SYRINGE KIT

## (undated) DEVICE — GLV SURG SIGNATURE ESSENTIAL PF LTX SZ7.5

## (undated) DEVICE — DRSNG WND GEL FIBR OPTICELL AG PLS W/SLV LF 4X5IN  STRL

## (undated) DEVICE — SYR LUERLOK 20CC BX/50

## (undated) DEVICE — SOL ISO/ALC RUB 70PCT 4OZ

## (undated) DEVICE — DRN WND CH RND FUL/FLUT NO/TROC 3/8IN 28F

## (undated) DEVICE — OASIS DRAIN, DUAL, IN-LINE, ATS COMPATIBLE: Brand: OASIS

## (undated) DEVICE — Device

## (undated) DEVICE — DECANTER: Brand: UNBRANDED

## (undated) DEVICE — CONTRST ISOVUE370 76PCT 100ML

## (undated) DEVICE — CONTAINER,SPECIMEN,O.R.STRL,4.5OZ: Brand: MEDLINE

## (undated) DEVICE — CORONARY ARTERY BYPASS GRAFT MARKERS, STAINLESS STEEL, DISTAL, WITHOUT HOLDER: Brand: ANASTOMARK CORONARY ARTERY BYPASS GRAFT MARKERS, STAINLESS STEEL, DISTAL

## (undated) DEVICE — BIOPATCH™ ANTIMICROBIAL DRESSING WITH CHLORHEXIDINE GLUCONATE IS A HYDROPHILLIC POLYURETHANE ABSORPTIVE FOAM WITH CHLORHEXIDINE GLUCONATE (CHG) WHICH INHIBITS BACTERIAL GROWTH UNDER THE DRESSING. THE DRESSING IS INTENDED TO BE USED TO ABSORB EXUDATE, COVER A WOUND CAUSED BY VASCULAR AND NONVASCULAR PERCUTANEOUS MEDICAL DEVICES DURING SURGERY, AS WELL AS REDUCE LOCAL INFECTION AND COLONIZATION OF MICROORGANISMS.: Brand: BIOPATCH

## (undated) DEVICE — CLAMP INSERT: Brand: STEALTH® CLAMP INSERT

## (undated) DEVICE — TBG ART PRESS 24 IN

## (undated) DEVICE — BLOWER/MISTER AXIOUS OPCAB W/TBG

## (undated) DEVICE — SHT AIR TRANSFR COMFRT GLIDE LAT 40X80IN

## (undated) DEVICE — MEDICINE CUP, GRADUATED, STER: Brand: MEDLINE

## (undated) DEVICE — DRSNG SURESITE WNDW 4X4.5

## (undated) DEVICE — ADHS SKIN SURG TISS VISC PREMIERPRO EXOFIN HI/VISC FAST/DRY

## (undated) DEVICE — HARMONIC SYNERGY DISSECTING HOOK WITH TORQUE WRENCH. FOR USE WITH BLUE HAND PIECE ONLY: Brand: HARMONIC SYNERGY

## (undated) DEVICE — CATH LAB PACK: Brand: MEDLINE INDUSTRIES, INC.

## (undated) DEVICE — PK PERFUS CUST W/CARDIOPLEGIA

## (undated) DEVICE — SYS PERFUS SEP PLATLT W TIPS CUST

## (undated) DEVICE — RADIFOCUS OPTITORQUE ANGIOGRAPHIC CATHETER: Brand: OPTITORQUE

## (undated) DEVICE — 12 FOOT DISPOSABLE EXTENSION CABLE WITH SAFE CONNECT / SCREW-DOWN

## (undated) DEVICE — GLIDESHEATH SLENDER STAINLESS STEEL KIT: Brand: GLIDESHEATH SLENDER

## (undated) DEVICE — SUT SILK 0 CT1 CR8 18IN C021D

## (undated) DEVICE — SENSR O2 OXIMAX FNGR ADHS A/ 1P/U

## (undated) DEVICE — ROTATING SURGICAL PUNCHES, 1 PER POUCH: Brand: A&E MEDICAL / ROTATING SURGICAL PUNCHES

## (undated) DEVICE — CANN ART SOFTFLOW EXT W/SUT/RNG 7MM

## (undated) DEVICE — APPL CHLORAPREP HI/LITE TINTED 10.5ML ORNG

## (undated) DEVICE — SYS VASOVIEW HEMOPRO ENDOSCOPIC HARVST VESL

## (undated) DEVICE — PK HEART OPN 40

## (undated) DEVICE — SUT PROLN MO.5 7/0 DBLARM BV175 6 2X30 BX/12

## (undated) DEVICE — PK SUT OPN HEART POLLOCK CUST

## (undated) DEVICE — SOL IRR H2O BTL 1000ML STRL

## (undated) DEVICE — MODEL BT2000 P/N 700287-012KIT CONTENTS: MANIFOLD WITH SALINE AND CONTRAST PORTS, SALINE TUBING WITH SPIKE AND HAND SYRINGE, TRANSDUCER: Brand: BT2000 AUTOMATED MANIFOLD KIT

## (undated) DEVICE — DRP SLUSH WARMR MACH CIR 44X44IN

## (undated) DEVICE — SOL IRR NACL 0.9PCT BT 1000ML

## (undated) DEVICE — DRSNG SURESITE WNDW 2.38X2.75

## (undated) DEVICE — TBG INSUFFLATION LUER LOCK: Brand: MEDLINE INDUSTRIES, INC.

## (undated) DEVICE — GLV SURG BIOGEL M LTX PF 7 1/2

## (undated) DEVICE — ST. SORBAVIEW ULTIMATE IJ SYSTEM A,C: Brand: CENTURION

## (undated) DEVICE — CANNULA,OXY,ADULT,SUPER SOFT,W/14'TUB,UC: Brand: MEDLINE INDUSTRIES, INC.

## (undated) DEVICE — LOU OPEN HEART DR POLLOCK: Brand: MEDLINE INDUSTRIES, INC.